# Patient Record
Sex: MALE | Race: WHITE | HISPANIC OR LATINO
[De-identification: names, ages, dates, MRNs, and addresses within clinical notes are randomized per-mention and may not be internally consistent; named-entity substitution may affect disease eponyms.]

---

## 2020-05-18 ENCOUNTER — APPOINTMENT (OUTPATIENT)
Dept: MRI IMAGING | Facility: CLINIC | Age: 45
End: 2020-05-18

## 2020-05-18 ENCOUNTER — APPOINTMENT (OUTPATIENT)
Dept: RADIOLOGY | Facility: CLINIC | Age: 45
End: 2020-05-18

## 2020-06-04 ENCOUNTER — APPOINTMENT (OUTPATIENT)
Dept: PULMONOLOGY | Facility: CLINIC | Age: 45
End: 2020-06-04
Payer: COMMERCIAL

## 2020-06-04 VITALS
SYSTOLIC BLOOD PRESSURE: 139 MMHG | HEIGHT: 66 IN | HEART RATE: 85 BPM | OXYGEN SATURATION: 98 % | WEIGHT: 165 LBS | BODY MASS INDEX: 26.52 KG/M2 | DIASTOLIC BLOOD PRESSURE: 81 MMHG | TEMPERATURE: 98.1 F

## 2020-06-04 VITALS — DIASTOLIC BLOOD PRESSURE: 90 MMHG | SYSTOLIC BLOOD PRESSURE: 135 MMHG

## 2020-06-04 DIAGNOSIS — K21.9 GASTRO-ESOPHAGEAL REFLUX DISEASE W/OUT ESOPHAGITIS: ICD-10-CM

## 2020-06-04 DIAGNOSIS — G47.33 OBSTRUCTIVE SLEEP APNEA (ADULT) (PEDIATRIC): ICD-10-CM

## 2020-06-04 PROCEDURE — 99203 OFFICE O/P NEW LOW 30 MIN: CPT

## 2020-06-05 PROBLEM — K21.9 CHRONIC GERD: Status: ACTIVE | Noted: 2020-06-05

## 2020-06-05 PROBLEM — G47.33 OSA (OBSTRUCTIVE SLEEP APNEA): Status: ACTIVE | Noted: 2020-06-05

## 2020-06-05 NOTE — ASSESSMENT
[FreeTextEntry1] : History of sleep apnea currently has CPAP machine s10 fixed pressure device\par Lower pressure to 4 to assess tolerance. If tolerated increase based on AHI.\par Need to review diagnostic study to determine if patient is candidate for oral appliance therapy

## 2020-06-05 NOTE — PHYSICAL EXAM

## 2020-06-05 NOTE — HISTORY OF PRESENT ILLNESS
[Obstructive Sleep Apnea] : obstructive sleep apnea [Awakes Unrefreshed] : awakes unrefreshed [Awakes with Headache] : awakes with headache [Awakes with Dry Mouth] : awakes with dry mouth [Fatigue] : fatigue [Tired while Driving] : tired while driving [Snoring] : snoring [Witnessed Apneas] : witnessed apneas [Never] : never [TextBox_4] : Here for sleep apnea evaluation\par Presented with typical sleep apnea symptoms snoring witnessed apneas nonrestorative sleep\par Underwent diagnostic sleep study not available.\par Referred for titration study titrated to effective pressure of 6 CM and placed on CPAP\par Has been unable to sleep with CPAP more than one or 2 hours\par Weight is up and is unable to keep it on\par He is highly motivated to succeed with treatment.\par

## 2020-07-24 ENCOUNTER — APPOINTMENT (OUTPATIENT)
Dept: PULMONOLOGY | Facility: CLINIC | Age: 45
End: 2020-07-24
Payer: COMMERCIAL

## 2020-07-24 VITALS
HEART RATE: 69 BPM | HEIGHT: 66 IN | BODY MASS INDEX: 26.52 KG/M2 | OXYGEN SATURATION: 96 % | DIASTOLIC BLOOD PRESSURE: 83 MMHG | SYSTOLIC BLOOD PRESSURE: 118 MMHG | WEIGHT: 165 LBS | TEMPERATURE: 97.9 F

## 2020-07-24 PROCEDURE — 99213 OFFICE O/P EST LOW 20 MIN: CPT

## 2020-07-24 NOTE — PHYSICAL EXAM
[No Acute Distress] : no acute distress [II] : Mallampati Class: II [Normal Oropharynx] : normal oropharynx [Normal Appearance] : normal appearance [Normal Rate/Rhythm] : normal rate/rhythm [No Neck Mass] : no neck mass [Normal S1, S2] : normal s1, s2 [No Murmurs] : no murmurs [Clear to Auscultation Bilaterally] : clear to auscultation bilaterally [No Abnormalities] : no abnormalities [No Resp Distress] : no resp distress [Benign] : benign [No Clubbing] : no clubbing [Normal Gait] : normal gait [No Edema] : no edema [No Cyanosis] : no cyanosis [Normal Color/ Pigmentation] : normal color/ pigmentation [FROM] : FROM [No Focal Deficits] : no focal deficits [Oriented x3] : oriented x3 [Normal Affect] : normal affect

## 2020-07-24 NOTE — HISTORY OF PRESENT ILLNESS
[Never] : never [Obstructive Sleep Apnea] : obstructive sleep apnea [TextBox_4] : PRIOR: Here for sleep apnea evaluation\par Presented with typical sleep apnea symptoms snoring witnessed apneas nonrestorative sleep\par Underwent diagnostic sleep study not available.\par Referred for titration study titrated to effective pressure of 6 CM and placed on CPAP\par Has been unable to sleep with CPAP more than one or 2 hours\par Weight is up and is unable to keep it on\par He is highly motivated to succeed with treatment.\par \par \par CURRENT: Was not able to tolerate CPAP 4\par Requesting retrial with different mask\par Unable to locate initial diagnostic study [Awakes with Headache] : awakes with headache [Awakes with Dry Mouth] : awakes with dry mouth [Awakes Unrefreshed] : awakes unrefreshed [Snoring] : snoring [Fatigue] : fatigue [Tired while Driving] : tired while driving [Witnessed Apneas] : witnessed apneas

## 2020-07-24 NOTE — ASSESSMENT
[FreeTextEntry1] : History of sleep apnea currently has CPAP machine s10 fixed pressure device\par Retry CPAP for with wisp nasal mask\par Obtain new diagnostic study to determine candidacy for oral appliance therapy

## 2020-10-28 ENCOUNTER — APPOINTMENT (OUTPATIENT)
Dept: GASTROENTEROLOGY | Facility: CLINIC | Age: 45
End: 2020-10-28
Payer: COMMERCIAL

## 2020-10-28 PROCEDURE — 99072 ADDL SUPL MATRL&STAF TM PHE: CPT

## 2020-10-28 PROCEDURE — 99244 OFF/OP CNSLTJ NEW/EST MOD 40: CPT | Mod: 25

## 2020-10-28 NOTE — PHYSICAL EXAM
[General Appearance - Alert] : alert [Sclera] : the sclera and conjunctiva were normal [Outer Ear] : the ears and nose were normal in appearance [Neck Appearance] : the appearance of the neck was normal [Abdomen Soft] : soft [Abdomen Tenderness] : non-tender [] : no rash [No Focal Deficits] : no focal deficits [Affect] : the affect was normal

## 2020-11-02 NOTE — ASSESSMENT
[FreeTextEntry1] : 46 yo M with UC referred by Dr. Majano for 2nd opinion re: medical mgmt\par \par UC: history of pancolitis in 2003, now with proctitis on recent cscope in 2020 showing moderate disease.\par Currently in a flare with complaints of bloody diarrhea, urgency\par Recommend initiation of biologic- entyvio. Risks/benefits discussed and patient is agreeable. Until medication is approved, he will continue with mesalamine and prednisone taper\par Recommend repeating colonoscopy 6 months after starting therapy to assess for mucosal healing\par Routine blood work, and Hep B, Quantiferon prior to initiation \par \par Sunita Bray MD\par GI Fellow

## 2020-11-02 NOTE — CONSULT LETTER
[Dear  ___] : Dear  [unfilled], [Consult Letter:] : I had the pleasure of evaluating your patient, [unfilled]. [Please see my note below.] : Please see my note below. [Consult Closing:] : Thank you very much for allowing me to participate in the care of this patient.  If you have any questions, please do not hesitate to contact me. [Sincerely,] : Sincerely, [FreeTextEntry3] : Eduardo Johnson MD\par Associate Professor of Medicine\par Director IBD Program\par [facility]\par

## 2020-11-02 NOTE — HISTORY OF PRESENT ILLNESS
[FreeTextEntry1] : 44 yo M with HTN, UC, RINA, GERD referred by Dr. Majano to establish care \par \par Symptoms of bloody diarrhea in 2003, diagnosed with UC pancolitis . Since diagnosis he has been on mesalamine on and off. He would take it for a few months and then stop because he felt better. \par He has also been on steroid courses over 10 times in the past 17 years. \par Currently, since Aug, he reports being in a flare. He reports blood in stool, with 3 to 5 BMs diarrhea a day. Also reports urgency- he is a  and often has to pull to the side of the road to have a BM\par He is currently on prednisone 40, which he has been instructed to taper by 5 every few days.\par \par Trying to loose weight- eating healthier.\par \par Last colonoscopy in 10/2020- inflammation in rectum and sigmoid \par \par EIM: no rashes, ulcers. Joint pain in hands, knees, writs, hips - does not tarck GI symptoms \par \par FH: MGF: UC, colon cancer\par Meds: mesalamine, amlodipine, ppi. Denies NSAIDs\par Shx: inguinal hernia repair, vasectomy

## 2020-11-23 ENCOUNTER — NON-APPOINTMENT (OUTPATIENT)
Age: 45
End: 2020-11-23

## 2020-11-24 LAB
ALBUMIN SERPL ELPH-MCNC: 4.8 G/DL
ALP BLD-CCNC: 82 U/L
ALT SERPL-CCNC: 22 U/L
ANION GAP SERPL CALC-SCNC: 12 MMOL/L
AST SERPL-CCNC: 14 U/L
BASOPHILS # BLD AUTO: 0.01 K/UL
BASOPHILS NFR BLD AUTO: 0.2 %
BILIRUB SERPL-MCNC: 0.8 MG/DL
BUN SERPL-MCNC: 12 MG/DL
CALCIUM SERPL-MCNC: 9.6 MG/DL
CHLORIDE SERPL-SCNC: 103 MMOL/L
CO2 SERPL-SCNC: 26 MMOL/L
CREAT SERPL-MCNC: 0.91 MG/DL
CRP SERPL-MCNC: 0.48 MG/DL
EOSINOPHIL # BLD AUTO: 0.06 K/UL
EOSINOPHIL NFR BLD AUTO: 0.9 %
GLUCOSE SERPL-MCNC: 104 MG/DL
HCT VFR BLD CALC: 47.3 %
HGB BLD-MCNC: 15.6 G/DL
IMM GRANULOCYTES NFR BLD AUTO: 0.2 %
LYMPHOCYTES # BLD AUTO: 1.22 K/UL
LYMPHOCYTES NFR BLD AUTO: 19 %
MAN DIFF?: NORMAL
MCHC RBC-ENTMCNC: 30.4 PG
MCHC RBC-ENTMCNC: 33 GM/DL
MCV RBC AUTO: 92.2 FL
MONOCYTES # BLD AUTO: 0.54 K/UL
MONOCYTES NFR BLD AUTO: 8.4 %
NEUTROPHILS # BLD AUTO: 4.59 K/UL
NEUTROPHILS NFR BLD AUTO: 71.3 %
PLATELET # BLD AUTO: 334 K/UL
POTASSIUM SERPL-SCNC: 4.8 MMOL/L
PROT SERPL-MCNC: 7.3 G/DL
RBC # BLD: 5.13 M/UL
RBC # FLD: 12.6 %
SODIUM SERPL-SCNC: 140 MMOL/L
WBC # FLD AUTO: 6.43 K/UL

## 2020-11-25 LAB
HBV CORE IGG+IGM SER QL: NONREACTIVE
HBV SURFACE AB SER QL: NONREACTIVE
HBV SURFACE AG SER QL: NONREACTIVE

## 2020-11-30 LAB
M TB IFN-G BLD-IMP: NEGATIVE
QUANTIFERON TB PLUS MITOGEN MINUS NIL: 5.83 IU/ML
QUANTIFERON TB PLUS NIL: 0.01 IU/ML
QUANTIFERON TB PLUS TB1 MINUS NIL: 0 IU/ML
QUANTIFERON TB PLUS TB2 MINUS NIL: 0 IU/ML

## 2020-12-15 DIAGNOSIS — Z00.00 ENCOUNTER FOR GENERAL ADULT MEDICAL EXAMINATION W/OUT ABNORMAL FINDINGS: ICD-10-CM

## 2020-12-28 ENCOUNTER — APPOINTMENT (OUTPATIENT)
Dept: RHEUMATOLOGY | Facility: CLINIC | Age: 45
End: 2020-12-28
Payer: COMMERCIAL

## 2020-12-28 VITALS
HEIGHT: 66 IN | OXYGEN SATURATION: 98 % | TEMPERATURE: 98 F | HEART RATE: 64 BPM | BODY MASS INDEX: 26.52 KG/M2 | SYSTOLIC BLOOD PRESSURE: 128 MMHG | WEIGHT: 165 LBS | RESPIRATION RATE: 15 BRPM | DIASTOLIC BLOOD PRESSURE: 80 MMHG

## 2020-12-28 VITALS
HEIGHT: 66 IN | HEART RATE: 68 BPM | OXYGEN SATURATION: 97 % | WEIGHT: 165 LBS | RESPIRATION RATE: 16 BRPM | DIASTOLIC BLOOD PRESSURE: 82 MMHG | BODY MASS INDEX: 26.52 KG/M2 | TEMPERATURE: 97.8 F | SYSTOLIC BLOOD PRESSURE: 122 MMHG

## 2020-12-28 LAB — SARS-COV-2 N GENE NPH QL NAA+PROBE: NOT DETECTED

## 2020-12-28 PROCEDURE — 96365 THER/PROPH/DIAG IV INF INIT: CPT

## 2020-12-28 PROCEDURE — 99072 ADDL SUPL MATRL&STAF TM PHE: CPT

## 2020-12-28 RX ORDER — VEDOLIZUMAB 300 MG/5ML
300 INJECTION, POWDER, LYOPHILIZED, FOR SOLUTION INTRAVENOUS
Qty: 0 | Refills: 0 | Status: COMPLETED | OUTPATIENT
Start: 2020-12-18

## 2021-01-11 ENCOUNTER — APPOINTMENT (OUTPATIENT)
Dept: RHEUMATOLOGY | Facility: CLINIC | Age: 46
End: 2021-01-11
Payer: COMMERCIAL

## 2021-01-11 VITALS
DIASTOLIC BLOOD PRESSURE: 83 MMHG | TEMPERATURE: 98.4 F | OXYGEN SATURATION: 98 % | RESPIRATION RATE: 14 BRPM | HEIGHT: 66 IN | SYSTOLIC BLOOD PRESSURE: 127 MMHG | WEIGHT: 165 LBS | HEART RATE: 63 BPM | BODY MASS INDEX: 26.52 KG/M2

## 2021-01-11 PROCEDURE — 96365 THER/PROPH/DIAG IV INF INIT: CPT

## 2021-01-11 PROCEDURE — 99072 ADDL SUPL MATRL&STAF TM PHE: CPT

## 2021-01-11 RX ORDER — VEDOLIZUMAB 300 MG/5ML
300 INJECTION, POWDER, LYOPHILIZED, FOR SOLUTION INTRAVENOUS
Qty: 0 | Refills: 0 | Status: COMPLETED | OUTPATIENT
Start: 2021-01-11 | End: 1900-01-01

## 2021-01-11 RX ORDER — VEDOLIZUMAB 300 MG/5ML
300 INJECTION, POWDER, LYOPHILIZED, FOR SOLUTION INTRAVENOUS
Qty: 0 | Refills: 0 | Status: COMPLETED | OUTPATIENT
Start: 2021-01-11

## 2021-02-08 ENCOUNTER — APPOINTMENT (OUTPATIENT)
Dept: RHEUMATOLOGY | Facility: CLINIC | Age: 46
End: 2021-02-08
Payer: COMMERCIAL

## 2021-02-08 VITALS
BODY MASS INDEX: 26.52 KG/M2 | TEMPERATURE: 98.5 F | HEART RATE: 72 BPM | DIASTOLIC BLOOD PRESSURE: 86 MMHG | OXYGEN SATURATION: 98 % | RESPIRATION RATE: 15 BRPM | HEIGHT: 66 IN | SYSTOLIC BLOOD PRESSURE: 128 MMHG | WEIGHT: 165 LBS

## 2021-02-08 VITALS
RESPIRATION RATE: 15 BRPM | TEMPERATURE: 98.4 F | OXYGEN SATURATION: 99 % | SYSTOLIC BLOOD PRESSURE: 126 MMHG | BODY MASS INDEX: 26.52 KG/M2 | DIASTOLIC BLOOD PRESSURE: 85 MMHG | HEART RATE: 68 BPM | HEIGHT: 66 IN | WEIGHT: 165 LBS

## 2021-02-08 PROCEDURE — 99072 ADDL SUPL MATRL&STAF TM PHE: CPT

## 2021-02-08 PROCEDURE — 96365 THER/PROPH/DIAG IV INF INIT: CPT

## 2021-02-08 RX ORDER — VEDOLIZUMAB 300 MG/5ML
300 INJECTION, POWDER, LYOPHILIZED, FOR SOLUTION INTRAVENOUS
Qty: 0 | Refills: 0 | Status: COMPLETED | OUTPATIENT
Start: 2021-02-02

## 2021-02-09 LAB
ALBUMIN SERPL ELPH-MCNC: 4.6 G/DL
ALP BLD-CCNC: 85 U/L
ALT SERPL-CCNC: 28 U/L
ANION GAP SERPL CALC-SCNC: 13 MMOL/L
AST SERPL-CCNC: 19 U/L
BASOPHILS # BLD AUTO: 0.01 K/UL
BASOPHILS NFR BLD AUTO: 0.2 %
BILIRUB SERPL-MCNC: 0.8 MG/DL
BUN SERPL-MCNC: 12 MG/DL
CALCIUM SERPL-MCNC: 10.1 MG/DL
CHLORIDE SERPL-SCNC: 101 MMOL/L
CO2 SERPL-SCNC: 25 MMOL/L
CREAT SERPL-MCNC: 1.07 MG/DL
CRP SERPL-MCNC: 0.29 MG/DL
EOSINOPHIL # BLD AUTO: 0.15 K/UL
EOSINOPHIL NFR BLD AUTO: 3.2 %
GLUCOSE SERPL-MCNC: 102 MG/DL
HCT VFR BLD CALC: 48.3 %
HGB BLD-MCNC: 15.6 G/DL
IMM GRANULOCYTES NFR BLD AUTO: 0.2 %
LYMPHOCYTES # BLD AUTO: 1.35 K/UL
LYMPHOCYTES NFR BLD AUTO: 28.5 %
MAN DIFF?: NORMAL
MCHC RBC-ENTMCNC: 29.7 PG
MCHC RBC-ENTMCNC: 32.3 GM/DL
MCV RBC AUTO: 91.8 FL
MONOCYTES # BLD AUTO: 0.38 K/UL
MONOCYTES NFR BLD AUTO: 8 %
NEUTROPHILS # BLD AUTO: 2.83 K/UL
NEUTROPHILS NFR BLD AUTO: 59.9 %
PLATELET # BLD AUTO: 304 K/UL
POTASSIUM SERPL-SCNC: 4.3 MMOL/L
PROT SERPL-MCNC: 7.1 G/DL
RBC # BLD: 5.26 M/UL
RBC # FLD: 13 %
SODIUM SERPL-SCNC: 138 MMOL/L
WBC # FLD AUTO: 4.73 K/UL

## 2021-02-11 ENCOUNTER — APPOINTMENT (OUTPATIENT)
Dept: GASTROENTEROLOGY | Facility: CLINIC | Age: 46
End: 2021-02-11
Payer: COMMERCIAL

## 2021-02-11 PROCEDURE — 99214 OFFICE O/P EST MOD 30 MIN: CPT | Mod: GC,95

## 2021-02-11 RX ORDER — MESALAMINE 1.2 G/1
1.2 TABLET, DELAYED RELEASE ORAL DAILY
Qty: 60 | Refills: 2 | Status: DISCONTINUED | COMMUNITY
Start: 2020-10-28 | End: 2021-02-11

## 2021-02-11 NOTE — REASON FOR VISIT
[Home] : at home, [unfilled] , at the time of the visit. [Medical Office: ([facility])___] : at the medical office located in  [Verbal consent obtained from patient] : the patient, [unfilled] [Follow-Up: _____] : a [unfilled] follow-up visit

## 2021-02-13 NOTE — ASSESSMENT
[FreeTextEntry1] : 45 yo M with UC on Entyvio q8w, HTN, RINA, GERD here for followup. 3rd loading dose of Entyvio on 2/8/21\par \par Ulcerative Pancolitis dx in 2003 on Entyvio q8w \par Reports improvement in symptoms with mild disease.  Denies abd cramping, urgency or bloody BM. Completed 3rd loading dose on 2/8/21. \par -Continue Entyvio\par -Schedule colonoscopy in 2 months to assess mucosal healing\par \par GERD\par No change in symptoms, reports continue intermittent dysphagia since last endoscopy.  Last EGD in 2020 notable for hiatal hernia and gastritis.  Denies odynophagia, change in voice or unexplained wt loss.\par -On PPI intermittently, will trial 6 weeks of medication and reassess symptoms; consider esophagram at f/u\par -Lifestyle modification discussed including avoidance of food triggers\par \par HCM\par -Declined influenza vaccine \par -Hep B sAb nonreactive, discussed following PMD\par -COVID19 vaccine second dose on 2/9\par -Exposure to prednisone in the past, would obtain DEXA\par -Encourage annual skin examination with dermatology\par \par RTC 4w after next infusion of Entyvio\par \par Mukund Massey MD\par Gastroenterology Fellow

## 2021-02-13 NOTE — CONSULT LETTER
[Dear  ___] : Dear  [unfilled], [Courtesy Letter:] : I had the pleasure of seeing your patient, [unfilled], in my office today. [Please see my note below.] : Please see my note below. [Sincerely,] : Sincerely, [FreeTextEntry3] : Edaurdo Johnson MD\par Associate Professor of Medicine\par Director IBD Program\par [facility]\par

## 2021-02-13 NOTE — HISTORY OF PRESENT ILLNESS
[de-identified] : Requested interview via telephone\par 45 yo M with UC on Entyvio q8w, HTN, RINA, GERD here for followup.  Last infusion of Entyvio on 2/8/21\par Follows with Dr. Majano\par \par Abdominal pain improved without bloating/gassiness.  Belching improved as well.  Occasional PPI.  Intermittent dysphagia unchanged since last endoscopy.  \par Having 1-2 formed stool, denies blood or urgency\par Denies fever, chill, cp, sob, abd pain, nausea, vomiting, constipation, diarrhea, odynophagia\par \par Lab 2/5/21 \par WBC 4.7, Hgb 15.6, Plt 304\par Na 138, K 4.3, Cl 101, BUN 12, Cr 1.07, Bili 0.8, 85, AST 19, ALT 28, CRP 0.29 <- 0.48 (11/24/20)\par Hep B surf antibody nonreactive\par \par EIM: denies\par \par Initial Visit\par Symptoms of bloody diarrhea in 2003, diagnosed with UC pancolitis . Since diagnosis he has been on mesalamine on and off. He would take it for a few months and then stop because he felt better. \par He has also been on steroid courses over 10 times in the past 17 years. \par Currently, since Aug, he reports being in a flare. He reports blood in stool, with 3 to 5 BMs diarrhea a day. Also reports urgency- he is a  and often has to pull to the side of the road to have a BM\par He is currently on prednisone 40, which he has been instructed to taper by 5 every few days.\par \par Trying to loose weight- eating healthier.\par \par ENDOSCOPY\par Endoscopy 10/2020: Small hiatal hernia and gastritis\par Colonoscopy in 10/2020- inflammation in rectum and sigmoid \par \par FH: MGF: UC, colon cancer\par Meds: Entyvio, amlodipine, pantoprazole prn. Denies NSAIDs\par Shx: inguinal hernia repair, vasectomy

## 2021-04-05 ENCOUNTER — APPOINTMENT (OUTPATIENT)
Dept: RHEUMATOLOGY | Facility: CLINIC | Age: 46
End: 2021-04-05
Payer: COMMERCIAL

## 2021-04-05 ENCOUNTER — LABORATORY RESULT (OUTPATIENT)
Age: 46
End: 2021-04-05

## 2021-04-05 VITALS
DIASTOLIC BLOOD PRESSURE: 82 MMHG | RESPIRATION RATE: 15 BRPM | TEMPERATURE: 98 F | BODY MASS INDEX: 26.52 KG/M2 | OXYGEN SATURATION: 96 % | SYSTOLIC BLOOD PRESSURE: 119 MMHG | WEIGHT: 165 LBS | HEART RATE: 65 BPM | HEIGHT: 66 IN

## 2021-04-05 VITALS
SYSTOLIC BLOOD PRESSURE: 128 MMHG | HEART RATE: 70 BPM | RESPIRATION RATE: 16 BRPM | OXYGEN SATURATION: 96 % | BODY MASS INDEX: 26.52 KG/M2 | DIASTOLIC BLOOD PRESSURE: 85 MMHG | WEIGHT: 165 LBS | TEMPERATURE: 97.7 F | HEIGHT: 66 IN

## 2021-04-05 PROCEDURE — 99072 ADDL SUPL MATRL&STAF TM PHE: CPT

## 2021-04-05 PROCEDURE — 96365 THER/PROPH/DIAG IV INF INIT: CPT

## 2021-04-05 RX ORDER — VEDOLIZUMAB 300 MG/5ML
300 INJECTION, POWDER, LYOPHILIZED, FOR SOLUTION INTRAVENOUS
Qty: 0 | Refills: 0 | Status: COMPLETED | OUTPATIENT
Start: 2021-04-05 | End: 1900-01-01

## 2021-04-05 RX ORDER — VEDOLIZUMAB 300 MG/5ML
300 INJECTION, POWDER, LYOPHILIZED, FOR SOLUTION INTRAVENOUS
Qty: 0 | Refills: 0 | Status: COMPLETED | OUTPATIENT
Start: 2021-04-05

## 2021-04-12 LAB
ANTIBODIES TO VEDOLIZUMAB (ATV) CONCENTRATION: < 1.6 U/ML
PROMETHEUS ANSER VDZ: NORMAL
PROMETHEUS LABORATORY FOOTER: NORMAL
SERUM VEDOLIZUMAB (VDZ) CONCENTRATION: 31.4 UG/ML

## 2021-06-02 ENCOUNTER — APPOINTMENT (OUTPATIENT)
Dept: RHEUMATOLOGY | Facility: CLINIC | Age: 46
End: 2021-06-02
Payer: COMMERCIAL

## 2021-06-02 VITALS
TEMPERATURE: 98 F | OXYGEN SATURATION: 99 % | DIASTOLIC BLOOD PRESSURE: 82 MMHG | HEART RATE: 63 BPM | SYSTOLIC BLOOD PRESSURE: 127 MMHG | RESPIRATION RATE: 20 BRPM

## 2021-06-02 VITALS
HEART RATE: 70 BPM | SYSTOLIC BLOOD PRESSURE: 130 MMHG | RESPIRATION RATE: 16 BRPM | TEMPERATURE: 97.9 F | DIASTOLIC BLOOD PRESSURE: 83 MMHG | BODY MASS INDEX: 27.64 KG/M2 | HEIGHT: 66 IN | OXYGEN SATURATION: 97 % | WEIGHT: 172 LBS

## 2021-06-02 PROCEDURE — 96365 THER/PROPH/DIAG IV INF INIT: CPT

## 2021-06-02 RX ORDER — VEDOLIZUMAB 300 MG/5ML
300 INJECTION, POWDER, LYOPHILIZED, FOR SOLUTION INTRAVENOUS
Qty: 0 | Refills: 0 | Status: COMPLETED | OUTPATIENT
Start: 2021-05-25

## 2021-06-03 LAB
ALBUMIN SERPL ELPH-MCNC: 4.9 G/DL
ALP BLD-CCNC: 81 U/L
ALT SERPL-CCNC: 33 U/L
ANION GAP SERPL CALC-SCNC: 13 MMOL/L
AST SERPL-CCNC: 24 U/L
BASOPHILS # BLD AUTO: 0.02 K/UL
BASOPHILS NFR BLD AUTO: 0.4 %
BILIRUB SERPL-MCNC: 0.7 MG/DL
BUN SERPL-MCNC: 9 MG/DL
CALCIUM SERPL-MCNC: 9.8 MG/DL
CHLORIDE SERPL-SCNC: 101 MMOL/L
CO2 SERPL-SCNC: 26 MMOL/L
CREAT SERPL-MCNC: 0.97 MG/DL
CRP SERPL-MCNC: 3 MG/L
EOSINOPHIL # BLD AUTO: 0.19 K/UL
EOSINOPHIL NFR BLD AUTO: 3.9 %
GLUCOSE SERPL-MCNC: 99 MG/DL
HCT VFR BLD CALC: 46.9 %
HGB BLD-MCNC: 15.4 G/DL
IMM GRANULOCYTES NFR BLD AUTO: 0.2 %
LYMPHOCYTES # BLD AUTO: 1.25 K/UL
LYMPHOCYTES NFR BLD AUTO: 25.9 %
MAN DIFF?: NORMAL
MCHC RBC-ENTMCNC: 29.7 PG
MCHC RBC-ENTMCNC: 32.8 GM/DL
MCV RBC AUTO: 90.4 FL
MONOCYTES # BLD AUTO: 0.42 K/UL
MONOCYTES NFR BLD AUTO: 8.7 %
NEUTROPHILS # BLD AUTO: 2.94 K/UL
NEUTROPHILS NFR BLD AUTO: 60.9 %
PLATELET # BLD AUTO: 363 K/UL
POTASSIUM SERPL-SCNC: 4.7 MMOL/L
PROT SERPL-MCNC: 7.4 G/DL
RBC # BLD: 5.19 M/UL
RBC # FLD: 13 %
SODIUM SERPL-SCNC: 140 MMOL/L
WBC # FLD AUTO: 4.83 K/UL

## 2021-06-23 ENCOUNTER — APPOINTMENT (OUTPATIENT)
Dept: GASTROENTEROLOGY | Facility: CLINIC | Age: 46
End: 2021-06-23
Payer: COMMERCIAL

## 2021-06-23 VITALS
HEIGHT: 66 IN | SYSTOLIC BLOOD PRESSURE: 109 MMHG | TEMPERATURE: 97.8 F | WEIGHT: 165 LBS | BODY MASS INDEX: 26.52 KG/M2 | HEART RATE: 80 BPM | OXYGEN SATURATION: 97 % | RESPIRATION RATE: 16 BRPM | DIASTOLIC BLOOD PRESSURE: 60 MMHG

## 2021-06-23 DIAGNOSIS — R13.10 DYSPHAGIA, UNSPECIFIED: ICD-10-CM

## 2021-06-23 PROCEDURE — 99213 OFFICE O/P EST LOW 20 MIN: CPT

## 2021-06-24 NOTE — ASSESSMENT
[FreeTextEntry1] : 45 yo M with UC on Entyvio q8w, HTN, RINA, GERD here for followup, reports being 80% improved on Vedolizumab. Still having some unpredicatbility with looser stools after certain meals 1-2x/month. \par \par Ulcerative Pancolitis dx in 2003 on Entyvio q8w since Dec 2020 \par Reports improvement in symptoms by 80%.  Denies abd cramping, urgency or bloody BM.\par -Continue Entyvio, level 31.4 in April with no antibodies\par -Schedule colonoscopy with Dr. Khan to confirm mucosal healing and surveillance due Oct 2021\par \par GERD\par No change in symptoms, reports continue intermittent dysphagia since last endoscopy.  Last EGD in 2020 notable for hiatal hernia and gastritis.  Denies odynophagia, change in voice or unexplained wt loss.\par -On PPI with relief of burning but not dysphagia\par - esophagram emphasized to rule out dysmotility\par - if normal consider referral to discuss hiatal hernia surgery options \par \par HCM\par -Declined influenza vaccine \par -Hep B sAb nonreactive, discussed following PMD\par -COVID19 vaccine UTD\par -Exposure to prednisone in the past, would obtain DEXA; re-emphasized\par -Encourage annual skin examination with dermatology\par \par RTC post-procedure with Dr. Emerson

## 2021-06-24 NOTE — CONSULT LETTER
[Dear  ___] : Dear  [unfilled], [Courtesy Letter:] : I had the pleasure of seeing your patient, [unfilled], in my office today. [Please see my note below.] : Please see my note below. [Sincerely,] : Sincerely, [FreeTextEntry3] : Eduardo Johnson MD\par Associate Professor of Medicine\par Director IBD Program\par [facility]\par

## 2021-06-24 NOTE — HISTORY OF PRESENT ILLNESS
[de-identified] : \par 45 yo M with UC on Entyvio q8w since Dec 2020 with 80% improvement in symptoms; HTN, RINA, GERD here for followup. \par Referred by Dr. Majano\par \par Intermittent dysphagia unchanged since last endoscopy showed hiatal hernia despite PPI, heartburn has resolved.\par Having 1-2 formed stool, denies blood or urgency\par Denies fever, chill, cp, sob, abd pain, nausea, vomiting, constipation, diarrhea, odynophagia\par Does report looser stools 1-2x/month after certain meals, cannot predict or identify which meals. + flatulence on occasion from certain foods. Avoids dairy and caffeine. \par CRP normalized.\par \par EIM: denies\par \par Initial Visit\par Symptoms of bloody diarrhea in 2003, diagnosed with UC pancolitis . Since diagnosis he has been on mesalamine on and off. He would take it for a few months and then stop because he felt better. \par \par \par ENDOSCOPY\par Endoscopy 10/2020: Small hiatal hernia and gastritis\par Colonoscopy in 10/2020- inflammation in rectum and sigmoid \par \par FH: MGF: UC, colon cancer\par Meds: Entyvio, amlodipine, pantoprazole prn. Denies NSAIDs\par Shx: inguinal hernia repair, vasectomy \par Works as  in [place].

## 2021-06-24 NOTE — PHYSICAL EXAM
[General Appearance - Alert] : alert [General Appearance - In No Acute Distress] : in no acute distress [General Appearance - Well Nourished] : well nourished [Neck Appearance] : the appearance of the neck was normal [Neck Cervical Mass (___cm)] : no neck mass was observed [Respiration, Rhythm And Depth] : normal respiratory rhythm and effort [Exaggerated Use Of Accessory Muscles For Inspiration] : no accessory muscle use [Bowel Sounds] : normal bowel sounds [Abdomen Soft] : soft [Abdomen Tenderness] : non-tender [Abnormal Walk] : normal gait [Nail Clubbing] : no clubbing  or cyanosis of the fingernails [Musculoskeletal - Swelling] : no joint swelling seen [Skin Color & Pigmentation] : normal skin color and pigmentation [Skin Turgor] : normal skin turgor [Oriented To Time, Place, And Person] : oriented to person, place, and time [] : no rash [Impaired Insight] : insight and judgment were intact [Affect] : the affect was normal

## 2021-07-27 ENCOUNTER — APPOINTMENT (OUTPATIENT)
Dept: RHEUMATOLOGY | Facility: CLINIC | Age: 46
End: 2021-07-27
Payer: COMMERCIAL

## 2021-07-27 ENCOUNTER — NON-APPOINTMENT (OUTPATIENT)
Age: 46
End: 2021-07-27

## 2021-07-27 VITALS
SYSTOLIC BLOOD PRESSURE: 127 MMHG | DIASTOLIC BLOOD PRESSURE: 84 MMHG | RESPIRATION RATE: 16 BRPM | HEART RATE: 64 BPM | TEMPERATURE: 97.9 F | OXYGEN SATURATION: 98 %

## 2021-07-27 VITALS
TEMPERATURE: 98 F | BODY MASS INDEX: 26.52 KG/M2 | OXYGEN SATURATION: 98 % | DIASTOLIC BLOOD PRESSURE: 82 MMHG | RESPIRATION RATE: 16 BRPM | HEART RATE: 68 BPM | SYSTOLIC BLOOD PRESSURE: 121 MMHG | WEIGHT: 165 LBS | HEIGHT: 66 IN

## 2021-07-27 PROCEDURE — 96365 THER/PROPH/DIAG IV INF INIT: CPT

## 2021-07-27 RX ORDER — VEDOLIZUMAB 300 MG/5ML
300 INJECTION, POWDER, LYOPHILIZED, FOR SOLUTION INTRAVENOUS
Qty: 0 | Refills: 0 | Status: COMPLETED | OUTPATIENT
Start: 2021-07-20

## 2021-07-28 LAB
ALBUMIN SERPL ELPH-MCNC: 4.8 G/DL
ALP BLD-CCNC: 86 U/L
ALT SERPL-CCNC: 26 U/L
ANION GAP SERPL CALC-SCNC: 12 MMOL/L
AST SERPL-CCNC: 17 U/L
BASOPHILS # BLD AUTO: 0.01 K/UL
BASOPHILS NFR BLD AUTO: 0.2 %
BILIRUB SERPL-MCNC: 1.1 MG/DL
BUN SERPL-MCNC: 11 MG/DL
CALCIUM SERPL-MCNC: 9.6 MG/DL
CHLORIDE SERPL-SCNC: 101 MMOL/L
CO2 SERPL-SCNC: 27 MMOL/L
CREAT SERPL-MCNC: 0.95 MG/DL
CRP SERPL-MCNC: 7 MG/L
EOSINOPHIL # BLD AUTO: 0.12 K/UL
EOSINOPHIL NFR BLD AUTO: 2.4 %
GLUCOSE SERPL-MCNC: 100 MG/DL
HCT VFR BLD CALC: 47.2 %
HGB BLD-MCNC: 15.5 G/DL
IMM GRANULOCYTES NFR BLD AUTO: 0.2 %
LYMPHOCYTES # BLD AUTO: 1.1 K/UL
LYMPHOCYTES NFR BLD AUTO: 22.2 %
MAN DIFF?: NORMAL
MCHC RBC-ENTMCNC: 30.3 PG
MCHC RBC-ENTMCNC: 32.8 GM/DL
MCV RBC AUTO: 92.4 FL
MONOCYTES # BLD AUTO: 0.54 K/UL
MONOCYTES NFR BLD AUTO: 10.9 %
NEUTROPHILS # BLD AUTO: 3.17 K/UL
NEUTROPHILS NFR BLD AUTO: 64.1 %
PLATELET # BLD AUTO: 328 K/UL
POTASSIUM SERPL-SCNC: 4.4 MMOL/L
PROT SERPL-MCNC: 7.5 G/DL
RBC # BLD: 5.11 M/UL
RBC # FLD: 13.1 %
SODIUM SERPL-SCNC: 140 MMOL/L
WBC # FLD AUTO: 4.95 K/UL

## 2021-09-21 ENCOUNTER — APPOINTMENT (OUTPATIENT)
Dept: RHEUMATOLOGY | Facility: CLINIC | Age: 46
End: 2021-09-21
Payer: COMMERCIAL

## 2021-09-21 VITALS
DIASTOLIC BLOOD PRESSURE: 86 MMHG | TEMPERATURE: 98.1 F | HEART RATE: 66 BPM | OXYGEN SATURATION: 98 % | SYSTOLIC BLOOD PRESSURE: 126 MMHG | RESPIRATION RATE: 14 BRPM

## 2021-09-21 VITALS
RESPIRATION RATE: 14 BRPM | SYSTOLIC BLOOD PRESSURE: 118 MMHG | OXYGEN SATURATION: 98 % | TEMPERATURE: 98.1 F | DIASTOLIC BLOOD PRESSURE: 79 MMHG | HEART RATE: 67 BPM

## 2021-09-21 PROCEDURE — 96365 THER/PROPH/DIAG IV INF INIT: CPT

## 2021-09-21 RX ORDER — VEDOLIZUMAB 300 MG/5ML
300 INJECTION, POWDER, LYOPHILIZED, FOR SOLUTION INTRAVENOUS
Qty: 0 | Refills: 0 | Status: COMPLETED | OUTPATIENT
Start: 2021-09-14

## 2021-11-23 ENCOUNTER — APPOINTMENT (OUTPATIENT)
Dept: RHEUMATOLOGY | Facility: CLINIC | Age: 46
End: 2021-11-23
Payer: COMMERCIAL

## 2021-11-23 VITALS
RESPIRATION RATE: 14 BRPM | HEART RATE: 71 BPM | OXYGEN SATURATION: 96 % | DIASTOLIC BLOOD PRESSURE: 81 MMHG | SYSTOLIC BLOOD PRESSURE: 117 MMHG | TEMPERATURE: 97.3 F

## 2021-11-23 VITALS
TEMPERATURE: 97.3 F | RESPIRATION RATE: 14 BRPM | HEART RATE: 65 BPM | SYSTOLIC BLOOD PRESSURE: 133 MMHG | OXYGEN SATURATION: 100 % | DIASTOLIC BLOOD PRESSURE: 83 MMHG

## 2021-11-23 PROCEDURE — 96365 THER/PROPH/DIAG IV INF INIT: CPT

## 2021-11-23 RX ORDER — VEDOLIZUMAB 300 MG/5ML
300 INJECTION, POWDER, LYOPHILIZED, FOR SOLUTION INTRAVENOUS
Qty: 0 | Refills: 0 | Status: COMPLETED | OUTPATIENT
Start: 2021-11-09

## 2021-11-24 LAB
BASOPHILS # BLD AUTO: 0.01 K/UL
BASOPHILS NFR BLD AUTO: 0.2 %
EOSINOPHIL # BLD AUTO: 0.14 K/UL
EOSINOPHIL NFR BLD AUTO: 2.3 %
HCT VFR BLD CALC: 48.3 %
HGB BLD-MCNC: 15.5 G/DL
IMM GRANULOCYTES NFR BLD AUTO: 0 %
LYMPHOCYTES # BLD AUTO: 1.23 K/UL
LYMPHOCYTES NFR BLD AUTO: 20.6 %
MAN DIFF?: NORMAL
MCHC RBC-ENTMCNC: 29.8 PG
MCHC RBC-ENTMCNC: 32.1 GM/DL
MCV RBC AUTO: 92.9 FL
MONOCYTES # BLD AUTO: 0.58 K/UL
MONOCYTES NFR BLD AUTO: 9.7 %
NEUTROPHILS # BLD AUTO: 4 K/UL
NEUTROPHILS NFR BLD AUTO: 67.2 %
PLATELET # BLD AUTO: 340 K/UL
RBC # BLD: 5.2 M/UL
RBC # FLD: 13 %
WBC # FLD AUTO: 5.96 K/UL

## 2021-11-25 LAB
ALBUMIN SERPL ELPH-MCNC: 4.6 G/DL
ALP BLD-CCNC: 87 U/L
ALT SERPL-CCNC: 31 U/L
ANION GAP SERPL CALC-SCNC: 12 MMOL/L
AST SERPL-CCNC: 21 U/L
BILIRUB SERPL-MCNC: 0.9 MG/DL
BUN SERPL-MCNC: 12 MG/DL
CALCIUM SERPL-MCNC: 9.5 MG/DL
CHLORIDE SERPL-SCNC: 103 MMOL/L
CO2 SERPL-SCNC: 25 MMOL/L
CREAT SERPL-MCNC: 0.97 MG/DL
CRP SERPL-MCNC: 5 MG/L
GLUCOSE SERPL-MCNC: 102 MG/DL
POTASSIUM SERPL-SCNC: 4.6 MMOL/L
PROT SERPL-MCNC: 7.4 G/DL
SODIUM SERPL-SCNC: 141 MMOL/L

## 2022-01-18 ENCOUNTER — APPOINTMENT (OUTPATIENT)
Dept: RHEUMATOLOGY | Facility: CLINIC | Age: 47
End: 2022-01-18
Payer: COMMERCIAL

## 2022-01-18 VITALS
HEART RATE: 74 BPM | DIASTOLIC BLOOD PRESSURE: 85 MMHG | TEMPERATURE: 98 F | OXYGEN SATURATION: 97 % | RESPIRATION RATE: 14 BRPM | SYSTOLIC BLOOD PRESSURE: 128 MMHG

## 2022-01-18 VITALS
SYSTOLIC BLOOD PRESSURE: 118 MMHG | DIASTOLIC BLOOD PRESSURE: 79 MMHG | HEART RATE: 65 BPM | RESPIRATION RATE: 14 BRPM | OXYGEN SATURATION: 97 % | TEMPERATURE: 98 F

## 2022-01-18 PROCEDURE — 96365 THER/PROPH/DIAG IV INF INIT: CPT

## 2022-01-18 RX ORDER — VEDOLIZUMAB 300 MG/5ML
300 INJECTION, POWDER, LYOPHILIZED, FOR SOLUTION INTRAVENOUS
Qty: 0 | Refills: 0 | Status: COMPLETED | OUTPATIENT
Start: 2022-01-04

## 2022-01-19 ENCOUNTER — NON-APPOINTMENT (OUTPATIENT)
Age: 47
End: 2022-01-19

## 2022-01-19 LAB
ALBUMIN SERPL ELPH-MCNC: 5 G/DL
ALP BLD-CCNC: 82 U/L
ALT SERPL-CCNC: 37 U/L
ANION GAP SERPL CALC-SCNC: 13 MMOL/L
AST SERPL-CCNC: 25 U/L
BASOPHILS # BLD AUTO: 0.01 K/UL
BASOPHILS NFR BLD AUTO: 0.2 %
BILIRUB SERPL-MCNC: 0.7 MG/DL
BUN SERPL-MCNC: 11 MG/DL
CALCIUM SERPL-MCNC: 9.8 MG/DL
CHLORIDE SERPL-SCNC: 102 MMOL/L
CO2 SERPL-SCNC: 27 MMOL/L
CREAT SERPL-MCNC: 1.01 MG/DL
CRP SERPL-MCNC: 3 MG/L
EOSINOPHIL # BLD AUTO: 0.1 K/UL
EOSINOPHIL NFR BLD AUTO: 2 %
GLUCOSE SERPL-MCNC: 109 MG/DL
HCT VFR BLD CALC: 50.5 %
HGB BLD-MCNC: 16 G/DL
IMM GRANULOCYTES NFR BLD AUTO: 0.2 %
LYMPHOCYTES # BLD AUTO: 1.4 K/UL
LYMPHOCYTES NFR BLD AUTO: 28.6 %
MAN DIFF?: NORMAL
MCHC RBC-ENTMCNC: 30.1 PG
MCHC RBC-ENTMCNC: 31.7 GM/DL
MCV RBC AUTO: 94.9 FL
MONOCYTES # BLD AUTO: 0.43 K/UL
MONOCYTES NFR BLD AUTO: 8.8 %
NEUTROPHILS # BLD AUTO: 2.94 K/UL
NEUTROPHILS NFR BLD AUTO: 60.2 %
PLATELET # BLD AUTO: 330 K/UL
POTASSIUM SERPL-SCNC: 4.8 MMOL/L
PROT SERPL-MCNC: 7.7 G/DL
RBC # BLD: 5.32 M/UL
RBC # FLD: 13.2 %
SODIUM SERPL-SCNC: 141 MMOL/L
WBC # FLD AUTO: 4.89 K/UL

## 2022-03-15 ENCOUNTER — APPOINTMENT (OUTPATIENT)
Dept: RHEUMATOLOGY | Facility: CLINIC | Age: 47
End: 2022-03-15
Payer: COMMERCIAL

## 2022-03-15 VITALS
HEART RATE: 65 BPM | OXYGEN SATURATION: 97 % | DIASTOLIC BLOOD PRESSURE: 85 MMHG | TEMPERATURE: 98.7 F | SYSTOLIC BLOOD PRESSURE: 126 MMHG | RESPIRATION RATE: 14 BRPM

## 2022-03-15 VITALS
OXYGEN SATURATION: 97 % | DIASTOLIC BLOOD PRESSURE: 84 MMHG | RESPIRATION RATE: 14 BRPM | HEART RATE: 66 BPM | TEMPERATURE: 98.7 F | SYSTOLIC BLOOD PRESSURE: 125 MMHG

## 2022-03-15 PROCEDURE — 96365 THER/PROPH/DIAG IV INF INIT: CPT

## 2022-03-15 RX ORDER — VEDOLIZUMAB 300 MG/5ML
300 INJECTION, POWDER, LYOPHILIZED, FOR SOLUTION INTRAVENOUS
Qty: 0 | Refills: 0 | Status: COMPLETED | OUTPATIENT
Start: 2022-03-15

## 2022-03-16 ENCOUNTER — LABORATORY RESULT (OUTPATIENT)
Age: 47
End: 2022-03-16

## 2022-03-16 ENCOUNTER — NON-APPOINTMENT (OUTPATIENT)
Age: 47
End: 2022-03-16

## 2022-03-16 ENCOUNTER — APPOINTMENT (OUTPATIENT)
Dept: CARDIOLOGY | Facility: CLINIC | Age: 47
End: 2022-03-16
Payer: COMMERCIAL

## 2022-03-16 VITALS
OXYGEN SATURATION: 98 % | BODY MASS INDEX: 26.52 KG/M2 | RESPIRATION RATE: 15 BRPM | HEART RATE: 65 BPM | TEMPERATURE: 98 F | WEIGHT: 165 LBS | HEIGHT: 66 IN

## 2022-03-16 DIAGNOSIS — Z82.49 FAMILY HISTORY OF ISCHEMIC HEART DISEASE AND OTHER DISEASES OF THE CIRCULATORY SYSTEM: ICD-10-CM

## 2022-03-16 PROCEDURE — 93000 ELECTROCARDIOGRAM COMPLETE: CPT

## 2022-03-16 PROCEDURE — 99203 OFFICE O/P NEW LOW 30 MIN: CPT

## 2022-03-16 NOTE — DISCUSSION/SUMMARY
[FreeTextEntry1] : This is a 47-year-old male with past medical history significant of hypertension, ulcerative colitis, dyspepsia, who comes in for cardiac consultation.  The patient is complaining of left-sided chest wall pain in the mid axillary area not associate with any particular activity.  He also had an episode of lightheadedness.  He denies chest pressure, shortness of breath, palpitations or syncope.\par He has no history of rheumatic fever.  He does not drink excessive caffeine or alcohol.\par Cardiac risk factors include hypertension, inflammatory bowel disease (is a risk enhancing feature), and positive family history of heart disease (his grandfather had a significant myocardial infarction age 45.\par The patient is on infusion therapy for his ulcerative colitis.\par Electrocardiogram done March 16, 2022 demonstrated normal sinus rhythm rate 65 bpm is otherwise unremarkable.\par Blood work done September 29, 2020 demonstrated cholesterol 135, HDL 38, triglycerides 190, LDL cholesterol 71 mg/dL.\par The patient reports that he had a normal stress echocardiogram the week of March 7, 2022.\par He will get me copies of this examination.\par His blood pressure is elevated today but he reports that his blood pressure is always within normal limits during his infusions, and other doctor visits.  He is currently taking amlodipine 5 mg daily, and I would recommend switching him to Micardis 80 mg daily if his blood pressure is elevated at the time of next visit.\par I feel that his chest pain is musculoskeletal in nature.  He was doing heavy work, cleaning his office and surrounding area.\par He will have new blood work done today for lipid panel SMA-20.\par He will follow up with me in 4 to 6 weeks to recheck his blood pressure.  He understands he must limit his salt intake.\par The patient understands that aerobic exercises must be increased to 40 minutes 4 times per week. A detailed discussion of lifestyle modification was done today. The patient has a good understanding of the diagnosis, and treatment plan. Lifestyle modification was also outlined.

## 2022-03-16 NOTE — PHYSICAL EXAM
[Well Developed] : well developed [Well Nourished] : well nourished [No Acute Distress] : no acute distress [Normal Conjunctiva] : normal conjunctiva [Normal Venous Pressure] : normal venous pressure [No Carotid Bruit] : no carotid bruit [Normal S1, S2] : normal S1, S2 [No Rub] : no rub [5th Left ICS - MCL] : palpated at the 5th LICS in the midclavicular line [Normal] : normal [No Precordial Heave] : no precordial heave was noted [Normal Rate] : normal [Rhythm Regular] : regular [Normal S1] : normal S1 [Normal S2] : normal S2 [No Gallop] : no gallop heard [S3] : no S3 [S4] : no S4 [I] : a grade 1 [No Pitting Edema] : no pitting edema present [Right Carotid Bruit] : no bruit heard over the right carotid [Left Carotid Bruit] : no bruit heard over the left carotid [Right Femoral Bruit] : no bruit heard over the right femoral artery [Left Femoral Bruit] : no bruit heard over the left femoral artery [2+] : left 2+ [No Abnormalities] : the abdominal aorta was not enlarged and no bruit was heard [Clear Lung Fields] : clear lung fields [Good Air Entry] : good air entry [No Respiratory Distress] : no respiratory distress  [Soft] : abdomen soft [Non Tender] : non-tender [No Masses/organomegaly] : no masses/organomegaly [Normal Bowel Sounds] : normal bowel sounds [Normal Gait] : normal gait [No Edema] : no edema [No Cyanosis] : no cyanosis [No Clubbing] : no clubbing [No Varicosities] : no varicosities [No Rash] : no rash [No Skin Lesions] : no skin lesions [Moves all extremities] : moves all extremities [No Focal Deficits] : no focal deficits [Normal Speech] : normal speech [Alert and Oriented] : alert and oriented [Normal memory] : normal memory

## 2022-03-17 LAB
ALBUMIN SERPL ELPH-MCNC: 4.8 G/DL
ALP BLD-CCNC: 76 U/L
ALT SERPL-CCNC: 35 U/L
ANION GAP SERPL CALC-SCNC: 13 MMOL/L
AST SERPL-CCNC: 23 U/L
BASOPHILS # BLD AUTO: 0.01 K/UL
BASOPHILS NFR BLD AUTO: 0.2 %
BILIRUB SERPL-MCNC: 0.6 MG/DL
BUN SERPL-MCNC: 9 MG/DL
CALCIUM SERPL-MCNC: 9.4 MG/DL
CHLORIDE SERPL-SCNC: 100 MMOL/L
CO2 SERPL-SCNC: 26 MMOL/L
CREAT SERPL-MCNC: 0.95 MG/DL
CRP SERPL-MCNC: 3 MG/L
EGFR: 98 ML/MIN/1.73M2
EOSINOPHIL # BLD AUTO: 0.1 K/UL
EOSINOPHIL NFR BLD AUTO: 2 %
GLUCOSE SERPL-MCNC: 107 MG/DL
HCT VFR BLD CALC: 46.4 %
HGB BLD-MCNC: 15.3 G/DL
IMM GRANULOCYTES NFR BLD AUTO: 0.2 %
LYMPHOCYTES # BLD AUTO: 1.35 K/UL
LYMPHOCYTES NFR BLD AUTO: 27.3 %
MAN DIFF?: NORMAL
MCHC RBC-ENTMCNC: 29.8 PG
MCHC RBC-ENTMCNC: 33 GM/DL
MCV RBC AUTO: 90.3 FL
MONOCYTES # BLD AUTO: 0.44 K/UL
MONOCYTES NFR BLD AUTO: 8.9 %
NEUTROPHILS # BLD AUTO: 3.04 K/UL
NEUTROPHILS NFR BLD AUTO: 61.4 %
PLATELET # BLD AUTO: 321 K/UL
POTASSIUM SERPL-SCNC: 4.4 MMOL/L
PROT SERPL-MCNC: 7.3 G/DL
RBC # BLD: 5.14 M/UL
RBC # FLD: 13.1 %
SODIUM SERPL-SCNC: 139 MMOL/L
WBC # FLD AUTO: 4.95 K/UL

## 2022-05-10 ENCOUNTER — APPOINTMENT (OUTPATIENT)
Dept: RHEUMATOLOGY | Facility: CLINIC | Age: 47
End: 2022-05-10
Payer: COMMERCIAL

## 2022-05-10 VITALS
HEART RATE: 69 BPM | OXYGEN SATURATION: 98 % | SYSTOLIC BLOOD PRESSURE: 135 MMHG | TEMPERATURE: 98.1 F | RESPIRATION RATE: 14 BRPM | DIASTOLIC BLOOD PRESSURE: 85 MMHG

## 2022-05-10 VITALS
OXYGEN SATURATION: 97 % | TEMPERATURE: 98.1 F | DIASTOLIC BLOOD PRESSURE: 84 MMHG | RESPIRATION RATE: 14 BRPM | HEART RATE: 65 BPM | SYSTOLIC BLOOD PRESSURE: 114 MMHG

## 2022-05-10 PROCEDURE — 96365 THER/PROPH/DIAG IV INF INIT: CPT

## 2022-05-10 RX ORDER — VEDOLIZUMAB 300 MG/5ML
300 INJECTION, POWDER, LYOPHILIZED, FOR SOLUTION INTRAVENOUS
Qty: 0 | Refills: 0 | Status: COMPLETED | OUTPATIENT
Start: 2022-03-01

## 2022-05-11 ENCOUNTER — NON-APPOINTMENT (OUTPATIENT)
Age: 47
End: 2022-05-11

## 2022-05-11 LAB
ALBUMIN SERPL ELPH-MCNC: 4.9 G/DL
ALP BLD-CCNC: 80 U/L
ALT SERPL-CCNC: 54 U/L
ANION GAP SERPL CALC-SCNC: 13 MMOL/L
AST SERPL-CCNC: 32 U/L
BASOPHILS # BLD AUTO: 0.01 K/UL
BASOPHILS NFR BLD AUTO: 0.2 %
BILIRUB SERPL-MCNC: 0.7 MG/DL
BUN SERPL-MCNC: 10 MG/DL
CALCIUM SERPL-MCNC: 9.8 MG/DL
CHLORIDE SERPL-SCNC: 102 MMOL/L
CO2 SERPL-SCNC: 27 MMOL/L
CREAT SERPL-MCNC: 0.99 MG/DL
CRP SERPL-MCNC: 4 MG/L
EGFR: 93 ML/MIN/1.73M2
EOSINOPHIL # BLD AUTO: 0.18 K/UL
EOSINOPHIL NFR BLD AUTO: 3.7 %
GLUCOSE SERPL-MCNC: 103 MG/DL
HCT VFR BLD CALC: 46.7 %
HGB BLD-MCNC: 15.7 G/DL
IMM GRANULOCYTES NFR BLD AUTO: 0.2 %
LYMPHOCYTES # BLD AUTO: 1.25 K/UL
LYMPHOCYTES NFR BLD AUTO: 25.5 %
MAN DIFF?: NORMAL
MCHC RBC-ENTMCNC: 30.5 PG
MCHC RBC-ENTMCNC: 33.6 GM/DL
MCV RBC AUTO: 90.7 FL
MONOCYTES # BLD AUTO: 0.5 K/UL
MONOCYTES NFR BLD AUTO: 10.2 %
NEUTROPHILS # BLD AUTO: 2.95 K/UL
NEUTROPHILS NFR BLD AUTO: 60.2 %
PLATELET # BLD AUTO: 332 K/UL
POTASSIUM SERPL-SCNC: 4.7 MMOL/L
PROT SERPL-MCNC: 7.8 G/DL
RBC # BLD: 5.15 M/UL
RBC # FLD: 12.7 %
SODIUM SERPL-SCNC: 141 MMOL/L
WBC # FLD AUTO: 4.9 K/UL

## 2022-05-16 ENCOUNTER — APPOINTMENT (OUTPATIENT)
Dept: CARDIOLOGY | Facility: CLINIC | Age: 47
End: 2022-05-16
Payer: COMMERCIAL

## 2022-05-16 VITALS
WEIGHT: 173 LBS | HEIGHT: 66 IN | BODY MASS INDEX: 27.8 KG/M2 | RESPIRATION RATE: 16 BRPM | DIASTOLIC BLOOD PRESSURE: 80 MMHG | SYSTOLIC BLOOD PRESSURE: 128 MMHG | OXYGEN SATURATION: 96 % | HEART RATE: 80 BPM | TEMPERATURE: 97.7 F

## 2022-05-16 DIAGNOSIS — R07.9 CHEST PAIN, UNSPECIFIED: ICD-10-CM

## 2022-05-16 PROCEDURE — 99214 OFFICE O/P EST MOD 30 MIN: CPT

## 2022-05-16 NOTE — DISCUSSION/SUMMARY
[FreeTextEntry1] : This is a 47-year-old male with past medical history significant of hypertension, ulcerative colitis, dyspepsia, who comes in for cardiac follow-up evaluation.\par He denies shortness of breath, palpitations, dizziness, PND orthopnea or syncope.\par The patient is still complaining of occasional left-sided chest wall pain in the mid axillary area not associated with any particular activity.  He also had an episode of lightheadedness.  He denies chest pressure, shortness of breath, palpitations or syncope.\par He has no history of rheumatic fever.  He does not drink excessive caffeine or alcohol.\par Cardiac risk factors include hypertension, inflammatory bowel disease (is a risk enhancing feature), and positive family history of heart disease (his grandfather had a significant myocardial infarction age 45.\par The patient is on infusion therapy for his ulcerative colitis.\par Electrocardiogram done March 16, 2022 demonstrated normal sinus rhythm rate 65 bpm is otherwise unremarkable.\par His blood pressure is under adequate control on Norvasc 5 mg daily.\par He had blood work done with his primary care physician May 10, 2022 for CBC, and SMA-20 which were within normal limits.\par He will continue on his current diet and exercise program.  His left-sided mid axillary chest discomfort is rare and is consistent musculoskeletal pain.  I have asked him to return to his primary care physician for further investigation.\par He is currently hemodynamically stable and asymptomatic from a cardiac standpoint.\par Blood work done September 29, 2020 demonstrated cholesterol 135, HDL 38, triglycerides 190, LDL cholesterol 71 mg/dL.\par The patient reports that he had a normal stress echocardiogram the week of March 7, 2022.\par He will get me copies of this examination.\par \par The patient understands that aerobic exercises must be increased to 40 minutes 4 times per week. A detailed discussion of lifestyle modification was done today. The patient has a good understanding of the diagnosis, and treatment plan. Lifestyle modification was also outlined.

## 2022-05-16 NOTE — PHYSICAL EXAM
[Well Developed] : well developed [Well Nourished] : well nourished [No Acute Distress] : no acute distress [Normal Conjunctiva] : normal conjunctiva [Normal Venous Pressure] : normal venous pressure [No Carotid Bruit] : no carotid bruit [Normal S1, S2] : normal S1, S2 [No Rub] : no rub [5th Left ICS - MCL] : palpated at the 5th LICS in the midclavicular line [Normal] : normal [No Precordial Heave] : no precordial heave was noted [Normal Rate] : normal [Rhythm Regular] : regular [Normal S1] : normal S1 [Normal S2] : normal S2 [No Gallop] : no gallop heard [I] : a grade 1 [No Pitting Edema] : no pitting edema present [2+] : left 2+ [No Abnormalities] : the abdominal aorta was not enlarged and no bruit was heard [Clear Lung Fields] : clear lung fields [Good Air Entry] : good air entry [No Respiratory Distress] : no respiratory distress  [Soft] : abdomen soft [Non Tender] : non-tender [No Masses/organomegaly] : no masses/organomegaly [Normal Bowel Sounds] : normal bowel sounds [Normal Gait] : normal gait [No Edema] : no edema [No Cyanosis] : no cyanosis [No Clubbing] : no clubbing [No Varicosities] : no varicosities [No Rash] : no rash [No Skin Lesions] : no skin lesions [Moves all extremities] : moves all extremities [No Focal Deficits] : no focal deficits [Normal Speech] : normal speech [Alert and Oriented] : alert and oriented [Normal memory] : normal memory [S3] : no S3 [S4] : no S4 [Right Carotid Bruit] : no bruit heard over the right carotid [Left Carotid Bruit] : no bruit heard over the left carotid [Right Femoral Bruit] : no bruit heard over the right femoral artery [Left Femoral Bruit] : no bruit heard over the left femoral artery

## 2022-07-05 ENCOUNTER — APPOINTMENT (OUTPATIENT)
Dept: RHEUMATOLOGY | Facility: CLINIC | Age: 47
End: 2022-07-05

## 2022-07-05 VITALS
DIASTOLIC BLOOD PRESSURE: 80 MMHG | TEMPERATURE: 98.1 F | OXYGEN SATURATION: 97 % | SYSTOLIC BLOOD PRESSURE: 122 MMHG | RESPIRATION RATE: 14 BRPM | HEART RATE: 66 BPM

## 2022-07-05 VITALS
DIASTOLIC BLOOD PRESSURE: 81 MMHG | HEART RATE: 65 BPM | SYSTOLIC BLOOD PRESSURE: 126 MMHG | RESPIRATION RATE: 14 BRPM | OXYGEN SATURATION: 96 % | TEMPERATURE: 98.1 F

## 2022-07-05 PROCEDURE — 96365 THER/PROPH/DIAG IV INF INIT: CPT

## 2022-07-05 RX ORDER — VEDOLIZUMAB 300 MG/5ML
300 INJECTION, POWDER, LYOPHILIZED, FOR SOLUTION INTRAVENOUS
Qty: 0 | Refills: 0 | Status: COMPLETED | OUTPATIENT
Start: 2022-07-05

## 2022-08-30 ENCOUNTER — APPOINTMENT (OUTPATIENT)
Dept: RHEUMATOLOGY | Facility: CLINIC | Age: 47
End: 2022-08-30

## 2022-08-30 VITALS
HEART RATE: 56 BPM | OXYGEN SATURATION: 99 % | RESPIRATION RATE: 14 BRPM | TEMPERATURE: 98.1 F | DIASTOLIC BLOOD PRESSURE: 81 MMHG | SYSTOLIC BLOOD PRESSURE: 122 MMHG

## 2022-08-30 VITALS
OXYGEN SATURATION: 97 % | DIASTOLIC BLOOD PRESSURE: 84 MMHG | HEART RATE: 64 BPM | RESPIRATION RATE: 14 BRPM | TEMPERATURE: 98.1 F | SYSTOLIC BLOOD PRESSURE: 123 MMHG

## 2022-08-30 PROCEDURE — 96365 THER/PROPH/DIAG IV INF INIT: CPT

## 2022-08-30 RX ORDER — VEDOLIZUMAB 300 MG/5ML
300 INJECTION, POWDER, LYOPHILIZED, FOR SOLUTION INTRAVENOUS
Qty: 0 | Refills: 0 | Status: COMPLETED | OUTPATIENT
Start: 2022-08-26

## 2022-09-01 LAB
ALBUMIN SERPL ELPH-MCNC: 5.2 G/DL
ALP BLD-CCNC: 90 U/L
ALT SERPL-CCNC: 45 U/L
ANION GAP SERPL CALC-SCNC: 14 MMOL/L
AST SERPL-CCNC: 26 U/L
BASOPHILS # BLD AUTO: 0.01 K/UL
BASOPHILS NFR BLD AUTO: 0.2 %
BILIRUB SERPL-MCNC: 0.6 MG/DL
BUN SERPL-MCNC: 12 MG/DL
CALCIUM SERPL-MCNC: 9.8 MG/DL
CHLORIDE SERPL-SCNC: 103 MMOL/L
CO2 SERPL-SCNC: 26 MMOL/L
CREAT SERPL-MCNC: 1.07 MG/DL
CRP SERPL-MCNC: 5 MG/L
EGFR: 85 ML/MIN/1.73M2
EOSINOPHIL # BLD AUTO: 0.12 K/UL
EOSINOPHIL NFR BLD AUTO: 2.3 %
GLUCOSE SERPL-MCNC: 95 MG/DL
HCT VFR BLD CALC: 49.3 %
HGB BLD-MCNC: 15.6 G/DL
IMM GRANULOCYTES NFR BLD AUTO: 0.2 %
LYMPHOCYTES # BLD AUTO: 1.3 K/UL
LYMPHOCYTES NFR BLD AUTO: 25.3 %
MAN DIFF?: NORMAL
MCHC RBC-ENTMCNC: 29.5 PG
MCHC RBC-ENTMCNC: 31.6 GM/DL
MCV RBC AUTO: 93.2 FL
MONOCYTES # BLD AUTO: 0.46 K/UL
MONOCYTES NFR BLD AUTO: 9 %
NEUTROPHILS # BLD AUTO: 3.23 K/UL
NEUTROPHILS NFR BLD AUTO: 63 %
PLATELET # BLD AUTO: 327 K/UL
POTASSIUM SERPL-SCNC: 4.5 MMOL/L
PROT SERPL-MCNC: 7.6 G/DL
RBC # BLD: 5.29 M/UL
RBC # FLD: 13.1 %
SODIUM SERPL-SCNC: 143 MMOL/L
WBC # FLD AUTO: 5.13 K/UL

## 2022-09-06 ENCOUNTER — NON-APPOINTMENT (OUTPATIENT)
Age: 47
End: 2022-09-06

## 2022-09-06 LAB
ANTIBODIES TO VEDOLIZUMAB (ATV) CONCENTRATION: < 1.6 U/ML
PROMETHEUS ANSER VDZ: NORMAL
PROMETHEUS LABORATORY FOOTER: NORMAL
SERUM VEDOLIZUMAB (VDZ) CONCENTRATION: 15.8 UG/ML

## 2022-09-27 ENCOUNTER — LABORATORY RESULT (OUTPATIENT)
Age: 47
End: 2022-09-27

## 2022-09-27 ENCOUNTER — APPOINTMENT (OUTPATIENT)
Dept: CARDIOLOGY | Facility: CLINIC | Age: 47
End: 2022-09-27

## 2022-09-27 VITALS — HEART RATE: 76 BPM

## 2022-09-27 VITALS
RESPIRATION RATE: 16 BRPM | HEIGHT: 66 IN | BODY MASS INDEX: 27.32 KG/M2 | DIASTOLIC BLOOD PRESSURE: 81 MMHG | OXYGEN SATURATION: 98 % | TEMPERATURE: 97.6 F | HEART RATE: 76 BPM | SYSTOLIC BLOOD PRESSURE: 124 MMHG | WEIGHT: 170 LBS

## 2022-09-27 PROCEDURE — 93306 TTE W/DOPPLER COMPLETE: CPT

## 2022-09-27 PROCEDURE — 99214 OFFICE O/P EST MOD 30 MIN: CPT | Mod: 25

## 2022-09-27 PROCEDURE — 93015 CV STRESS TEST SUPVJ I&R: CPT

## 2022-09-27 NOTE — DISCUSSION/SUMMARY
[FreeTextEntry1] : This is a 47-year-old male with past medical history significant of hypertension, ulcerative colitis, dyspepsia, who comes in for cardiac follow-up evaluation.\par He denies shortness of breath, palpitations, dizziness, PND orthopnea or syncope.\par He has no history of rheumatic fever.  He does not drink excessive caffeine or alcohol.\par Cardiac risk factors include hypertension, inflammatory bowel disease (is a risk enhancing feature), and positive family history of heart disease (his grandfather had a significant myocardial infarction age 45.\par The patient is on infusion therapy for his ulcerative colitis.\par The patient had normal exercise stress test September 27, 2022.\par He will have new blood work done today for lipid panel and electrolytes.\par The patient had been complaining of occasional left-sided chest wall pain in the mid axillary area not associated with any particular activity. \par Electrocardiogram done March 16, 2022 demonstrated normal sinus rhythm rate 65 bpm is otherwise unremarkable.\par His blood pressure is under adequate control on Norvasc 5 mg daily.  Every\par Prior echo Doppler examination done at [facility] August 19, 2020 demonstrated physiologic mitral and tricuspid valve regurgitation with normal left ventricular ejection fraction of 61%.\par \par He is currently hemodynamically stable and asymptomatic from a cardiac standpoint.\par Blood work done September 29, 2020 demonstrated cholesterol 135, HDL 38, triglycerides 190, LDL cholesterol 71 mg/dL.\par The patient reports that he had a normal stress echocardiogram the week of March 7, 2022.\par He will get me copies of this examination.\par \par The patient understands that aerobic exercises must be increased to 40 minutes 4 times per week. A detailed discussion of lifestyle modification was done today. The patient has a good understanding of the diagnosis, and treatment plan. Lifestyle modification was also outlined.

## 2022-09-27 NOTE — REASON FOR VISIT
[CV Risk Factors and Non-Cardiac Disease] : CV risk factors and non-cardiac disease [Hypertension] : hypertension [FreeTextEntry1] : This is a 47-year-old male with past medical history significant of hypertension, ulcerative colitis, dyspepsia, who comes in for exercise stress test.\par \par Patient states he is generally feeling well today however, he reports occasional dyspnea on exertion.\par He denies chest pain, lightheadedness, palpitations, dizziness, PND, orthopnea or syncope.\par \par He has no history of rheumatic fever.  He does not drink excessive caffeine or alcohol.\par Cardiac risk factors include hypertension, and positive family history of heart disease (his grandfather had a significant myocardial infarction age 45.\par \par His blood pressure is under adequate control on Norvasc 5 mg daily.\par \par The patient reports that he had a normal stress echocardiogram the week of March 7, 2022.

## 2022-09-28 ENCOUNTER — APPOINTMENT (OUTPATIENT)
Dept: GASTROENTEROLOGY | Facility: CLINIC | Age: 47
End: 2022-09-28

## 2022-09-28 VITALS
TEMPERATURE: 97.8 F | DIASTOLIC BLOOD PRESSURE: 70 MMHG | OXYGEN SATURATION: 97 % | RESPIRATION RATE: 16 BRPM | WEIGHT: 170 LBS | SYSTOLIC BLOOD PRESSURE: 118 MMHG | HEIGHT: 66 IN | BODY MASS INDEX: 27.32 KG/M2 | HEART RATE: 69 BPM

## 2022-09-28 DIAGNOSIS — R51.9 HEADACHE, UNSPECIFIED: ICD-10-CM

## 2022-09-28 DIAGNOSIS — R19.7 DIARRHEA, UNSPECIFIED: ICD-10-CM

## 2022-09-28 DIAGNOSIS — Z01.818 ENCOUNTER FOR OTHER PREPROCEDURAL EXAMINATION: ICD-10-CM

## 2022-09-28 PROCEDURE — 99214 OFFICE O/P EST MOD 30 MIN: CPT

## 2022-09-30 NOTE — HISTORY OF PRESENT ILLNESS
[FreeTextEntry1] : --------CURRENT HISTORY--------\par \par 46 yo M, with PanColitis UC (diagnosed 2003) currently on Entyvio q8 weeks since 12/2020 (with 80% improvement noted prior), HTN, HLD, RINA, GERD / Hiatal Hernia, here for in person follow up\par \par Reports he has no blood or extreme urgency associated with his BM's\par \par He does note about 1-2 BM's per day, notes some bloating, gas, and occasional bouts of diarrhea\par While his normal BM's are formed ("mostly"), his diarrhea episodes are brown watery, can occur once to three times a week\par He mentions he has Gallstones (never biliary colic, found incidentally when evaluated for nephrolithiasis)\par He mentions he has occasional oily slick yellow stools\par \par He has not been able to have the DEXA or Esophagram performed yet\par Last Scope in 10/2020\par Occasionally smokes Cigars\par No fhx of esophageal cancer\par Takes PPI every day in the morning\par \par --------PRIOR HISTORY------------\par 45 yo M with UC on Entyvio q8w since Dec 2020 with 80% improvement in symptoms; HTN, RINA, GERD here for followup. \par Referred by Dr. Majano\par \par Intermittent dysphagia unchanged since last endoscopy showed hiatal hernia despite PPI, heartburn has resolved.\par Having 1-2 formed stool, denies blood or urgency\par Denies fever, chill, cp, sob, abd pain, nausea, vomiting, constipation, diarrhea, odynophagia\par Does report looser stools 1-2x/month after certain meals, cannot predict or identify which meals. + flatulence on occasion from certain foods. Avoids dairy and caffeine. \par CRP normalized.\par \par EIM: denies\par \par Initial Visit\par Symptoms of bloody diarrhea in 2003, diagnosed with UC pancolitis. Since diagnosis he has been on mesalamine on and off. He would take it for a few months and then stop because he felt better. \par \par ENDOSCOPY\par Endoscopy 10/2020: Small hiatal hernia and gastritis\par Colonoscopy in 10/2020- inflammation in rectum and sigmoid \par \par FH: MGF: UC, colon cancer\par Meds: Entyvio, amlodipine, pantoprazole prn. Denies NSAIDs\par Shx: inguinal hernia repair, vasectomy \par Works as  in [place]. \par

## 2022-09-30 NOTE — PHYSICAL EXAM
[Normal] : the sclera and conjunctiva were normal [Hearing Threshold Finger Rub Not [place]] : hearing was normal [Normal Appearance] : the appearance of the neck was normal [No Respiratory Distress] : no respiratory distress [No Acc Muscle Use] : no accessory muscle use [Respiration, Rhythm And Depth] : normal respiratory rhythm and effort [Abdomen Tenderness] : non-tender [No Masses] : no abdominal mass palpated [Abdomen Soft] : soft [Normal Color / Pigmentation] : normal skin color and pigmentation [Normal Affect] : the affect was normal [Recent Memory Normal] : recent memory was not impaired [Normal Mood] : the mood was normal [Remote Memory Normal] : remote memory was not impaired [de-identified] : normal rate [de-identified] : +tattoo

## 2022-09-30 NOTE — ASSESSMENT
[FreeTextEntry1] : 46 yo M, with PanColitis UC (diagnosed 2003) currently on Entyvio q8 weeks since 12/2020 (with 80% improvement noted prior), HTN, HLD, RINA, GERD / Hiatal Hernia, here for in person follow up\par \par #PanColitis UC on Entyvio q8 weeks\par - recent chem and cbc wnl, recent VDZ Levels show no Ab's, Conc 15.8 from 31.4\par - with some persistent symptoms, recent CRP 8/30/22 of 5\par - Last C scope 10/2020, will plan for repeat Colonoscope (+/- EGD)\par - Due for C scope, plan with Miralax prep, COVID test ordered\par \par #Oily Stools\par - regarding his loose oily stools, would consider evaluation for other sources than his IBD such as pancreatic insufficiency \par - will send fecal elastase\par - if colon improved and elastase ok, consideration for fiber supplements and nutrition referral (admits has poor diet)\par - weight notably has been stable\par \par #GERD/Hiatal Hernia\par - on PPI daily, still with sour taste in mouth / heartburn\par - prior recommended Esophagram \par - plan for repeat EGD concurrent with Colonoscopy\par -  Male (near 48)\par \par #Headaches\par - Will refer to Neurology, noted history of 9/11 exposure\par \par Seen and examined with Dr. Johnson\par \par Onur Villeda MD\par GI Fellow\par [facility] Gastroenterology

## 2022-10-25 ENCOUNTER — APPOINTMENT (OUTPATIENT)
Dept: RHEUMATOLOGY | Facility: CLINIC | Age: 47
End: 2022-10-25

## 2022-10-25 VITALS
TEMPERATURE: 97.1 F | OXYGEN SATURATION: 97 % | HEART RATE: 77 BPM | RESPIRATION RATE: 14 BRPM | DIASTOLIC BLOOD PRESSURE: 81 MMHG | SYSTOLIC BLOOD PRESSURE: 119 MMHG

## 2022-10-25 VITALS
DIASTOLIC BLOOD PRESSURE: 80 MMHG | TEMPERATURE: 97.1 F | RESPIRATION RATE: 14 BRPM | HEART RATE: 69 BPM | SYSTOLIC BLOOD PRESSURE: 115 MMHG | OXYGEN SATURATION: 96 %

## 2022-10-25 PROCEDURE — 96365 THER/PROPH/DIAG IV INF INIT: CPT

## 2022-10-25 RX ORDER — VEDOLIZUMAB 300 MG/5ML
300 INJECTION, POWDER, LYOPHILIZED, FOR SOLUTION INTRAVENOUS
Qty: 0 | Refills: 0 | Status: COMPLETED | OUTPATIENT
Start: 2022-10-25

## 2022-10-25 RX ADMIN — VEDOLIZUMAB 1 MG: 300 INJECTION, POWDER, LYOPHILIZED, FOR SOLUTION INTRAVENOUS at 00:00

## 2022-10-26 LAB
ALBUMIN SERPL ELPH-MCNC: 4.5 G/DL
ALP BLD-CCNC: 85 U/L
ALT SERPL-CCNC: 37 U/L
ANION GAP SERPL CALC-SCNC: 12 MMOL/L
AST SERPL-CCNC: 26 U/L
BASOPHILS # BLD AUTO: 0.02 K/UL
BASOPHILS NFR BLD AUTO: 0.4 %
BILIRUB SERPL-MCNC: 0.8 MG/DL
BUN SERPL-MCNC: 10 MG/DL
CALCIUM SERPL-MCNC: 9.3 MG/DL
CHLORIDE SERPL-SCNC: 104 MMOL/L
CO2 SERPL-SCNC: 22 MMOL/L
CREAT SERPL-MCNC: 0.91 MG/DL
CRP SERPL-MCNC: 4 MG/L
EGFR: 103 ML/MIN/1.73M2
EOSINOPHIL # BLD AUTO: 0.11 K/UL
EOSINOPHIL NFR BLD AUTO: 2.2 %
GLUCOSE SERPL-MCNC: 108 MG/DL
HCT VFR BLD CALC: 45.2 %
HGB BLD-MCNC: 15.3 G/DL
IMM GRANULOCYTES NFR BLD AUTO: 0.2 %
LYMPHOCYTES # BLD AUTO: 1.16 K/UL
LYMPHOCYTES NFR BLD AUTO: 23.2 %
MAN DIFF?: NORMAL
MCHC RBC-ENTMCNC: 30.3 PG
MCHC RBC-ENTMCNC: 33.8 GM/DL
MCV RBC AUTO: 89.5 FL
MONOCYTES # BLD AUTO: 0.45 K/UL
MONOCYTES NFR BLD AUTO: 9 %
NEUTROPHILS # BLD AUTO: 3.25 K/UL
NEUTROPHILS NFR BLD AUTO: 65 %
PLATELET # BLD AUTO: 316 K/UL
POTASSIUM SERPL-SCNC: 4.6 MMOL/L
PROT SERPL-MCNC: 7.1 G/DL
RBC # BLD: 5.05 M/UL
RBC # FLD: 13.2 %
SODIUM SERPL-SCNC: 138 MMOL/L
WBC # FLD AUTO: 5 K/UL

## 2022-11-21 ENCOUNTER — RESULT REVIEW (OUTPATIENT)
Age: 47
End: 2022-11-21

## 2022-11-25 ENCOUNTER — NON-APPOINTMENT (OUTPATIENT)
Age: 47
End: 2022-11-25

## 2022-11-25 ENCOUNTER — LABORATORY RESULT (OUTPATIENT)
Age: 47
End: 2022-11-25

## 2022-11-25 ENCOUNTER — OUTPATIENT (OUTPATIENT)
Dept: OUTPATIENT SERVICES | Facility: HOSPITAL | Age: 47
LOS: 1 days | End: 2022-11-25
Payer: COMMERCIAL

## 2022-11-25 ENCOUNTER — APPOINTMENT (OUTPATIENT)
Dept: RADIOLOGY | Facility: HOSPITAL | Age: 47
End: 2022-11-25

## 2022-11-25 DIAGNOSIS — R13.10 DYSPHAGIA, UNSPECIFIED: ICD-10-CM

## 2022-11-25 PROCEDURE — 74221 X-RAY XM ESOPHAGUS 2CNTRST: CPT | Mod: 26

## 2022-11-25 PROCEDURE — 74221 X-RAY XM ESOPHAGUS 2CNTRST: CPT

## 2022-11-29 ENCOUNTER — OUTPATIENT (OUTPATIENT)
Dept: OUTPATIENT SERVICES | Facility: HOSPITAL | Age: 47
LOS: 1 days | Discharge: HOME | End: 2022-11-29
Payer: COMMERCIAL

## 2022-11-29 ENCOUNTER — RESULT REVIEW (OUTPATIENT)
Age: 47
End: 2022-11-29

## 2022-11-29 ENCOUNTER — APPOINTMENT (OUTPATIENT)
Dept: GASTROENTEROLOGY | Facility: HOSPITAL | Age: 47
End: 2022-11-29

## 2022-11-29 VITALS
RESPIRATION RATE: 16 BRPM | HEART RATE: 69 BPM | HEIGHT: 66 IN | OXYGEN SATURATION: 97 % | TEMPERATURE: 98 F | SYSTOLIC BLOOD PRESSURE: 118 MMHG | DIASTOLIC BLOOD PRESSURE: 70 MMHG | WEIGHT: 169.98 LBS

## 2022-11-29 PROCEDURE — 43239 EGD BIOPSY SINGLE/MULTIPLE: CPT

## 2022-11-29 PROCEDURE — 88305 TISSUE EXAM BY PATHOLOGIST: CPT | Mod: 26

## 2022-11-29 PROCEDURE — 45380 COLONOSCOPY AND BIOPSY: CPT

## 2022-11-29 PROCEDURE — 88305 TISSUE EXAM BY PATHOLOGIST: CPT

## 2022-11-29 NOTE — PRE-ANESTHESIA EVALUATION ADULT - NS MD HP INPLANTS MED DEV
None
Alert-The patient is alert, awake and responds to voice. The patient is oriented to time, place, and person. The triage nurse is able to obtain subjective information.

## 2022-11-29 NOTE — PRE-ANESTHESIA EVALUATION ADULT - NSANTHPEFT_GEN_ALL_CORE
57
General: Appearance is consistent with chronological age. No abnormal facies.  Constitutional: Alert and in no acute distress.  Eyes: The sclera and conjunctiva were normal, pupils were equal in size, round, and reactive to light and extraocular movements were intact.   ENT: The ears and nose were normal in appearance; oropharynx clear, moist mucus membranes.  Neck: The appearance of the neck was normal, no neck mass was observed. There was no jugular-venous distention.   Airway:  See Mallampati score.  Cardiovascular:  Regular rate and rhythm.  Respiratory: Unlabored breathing.  Neurological: No focal deficit, moves all extremities.  Psychiatric: Oriented to person, place, and time, insight and judgment were intact and the affect was normal.  Exercise Tolerance: MET>4.

## 2022-11-29 NOTE — PRE-ANESTHESIA EVALUATION ADULT - NSANTHHOMEMEDSFT_GEN_ALL_CORE
amLODIPine Besylate 5 MG Oral Tablet; take one pill daily p.o  Pantoprazole Sodium 20 MG Oral Tablet Delayed Release

## 2022-11-29 NOTE — PRE-ANESTHESIA EVALUATION ADULT - NSANTHPMHFT_GEN_ALL_CORE
PMHX:    Ulcerative Colitis  GERD  Dysphagia  Heart  Murmur  HTN  Cholelithiasis  Nephrolithiasis  RINA  HLD  Hiatal Hernia    HPI:  46 yo M, with PanColitis UC (diagnosed 2003) currently on Entyvio q8 weeks since 12/2020 (with 80% improvement noted prior), HTN, HLD, RINA, GERD / Hiatal Hernia, here for in person follow up PMHX:    Ulcerative Colitis  GERD  Dysphagia  Heart  Murmur  HTN  Cholelithiasis  Nephrolithiasis  RINA  HLD  Hiatal Hernia    PSH:  Henia Repair  Vasectomy      HPI:  48 yo M, with PanColitis UC (diagnosed 2003) currently on Entyvio q8 weeks since 12/2020 (with 80% improvement noted prior), HTN, HLD, RINA, GERD / Hiatal Hernia, here for in person follow up

## 2022-12-02 LAB — SURGICAL PATHOLOGY STUDY: SIGNIFICANT CHANGE UP

## 2022-12-20 ENCOUNTER — APPOINTMENT (OUTPATIENT)
Dept: RHEUMATOLOGY | Facility: CLINIC | Age: 47
End: 2022-12-20

## 2022-12-20 VITALS
HEART RATE: 65 BPM | DIASTOLIC BLOOD PRESSURE: 92 MMHG | RESPIRATION RATE: 14 BRPM | OXYGEN SATURATION: 99 % | TEMPERATURE: 97.1 F | SYSTOLIC BLOOD PRESSURE: 133 MMHG

## 2022-12-20 VITALS
HEART RATE: 66 BPM | TEMPERATURE: 97.1 F | SYSTOLIC BLOOD PRESSURE: 135 MMHG | RESPIRATION RATE: 14 BRPM | OXYGEN SATURATION: 99 % | DIASTOLIC BLOOD PRESSURE: 86 MMHG

## 2022-12-20 PROCEDURE — 96365 THER/PROPH/DIAG IV INF INIT: CPT

## 2022-12-20 RX ORDER — VEDOLIZUMAB 300 MG/5ML
300 INJECTION, POWDER, LYOPHILIZED, FOR SOLUTION INTRAVENOUS
Qty: 0 | Refills: 0 | Status: COMPLETED | OUTPATIENT
Start: 2022-12-15

## 2022-12-20 NOTE — HISTORY OF PRESENT ILLNESS
[Denies] : Denies [No] : No [Yes] : Yes [Informed consent documented in EHR.] : Informed consent documented in EHR. [TB] : Tuberculosis screening [Hep acute panel] : Hepatitis acute panel [Left upper extremity] : Left upper extremity [22g] : 22g [Start Time: ___] : Medication Start Time: [unfilled] [End Time: ___] : Medication End Time: [unfilled] [IV discontinued. Intact. No signs or symptoms of IV complications noted. Time: ___] : IV discontinued. Intact. No signs or symptoms of IV complications noted. Time: [unfilled] [Patient  instructed to seek medical attention with signs and symptoms of adverse effects] : Patient  instructed to seek medical attention with signs and symptoms of adverse effects [Patient left unit in no acute distress] : Patient left unit in no acute distress [Medications administered as ordered and tolerated well.] : Medications administered as ordered and tolerated well. [Blood drawn at time of visit] : Blood drawn at time of visit [de-identified] : [id number]

## 2022-12-21 ENCOUNTER — APPOINTMENT (OUTPATIENT)
Dept: GASTROENTEROLOGY | Facility: CLINIC | Age: 47
End: 2022-12-21

## 2022-12-21 VITALS
WEIGHT: 170 LBS | SYSTOLIC BLOOD PRESSURE: 125 MMHG | BODY MASS INDEX: 27.32 KG/M2 | DIASTOLIC BLOOD PRESSURE: 80 MMHG | HEIGHT: 66 IN | OXYGEN SATURATION: 97 % | TEMPERATURE: 96.5 F | HEART RATE: 72 BPM | RESPIRATION RATE: 14 BRPM

## 2022-12-21 DIAGNOSIS — R29.898 OTHER SYMPTOMS AND SIGNS INVOLVING THE MUSCULOSKELETAL SYSTEM: ICD-10-CM

## 2022-12-21 DIAGNOSIS — K90.9 INTESTINAL MALABSORPTION, UNSPECIFIED: ICD-10-CM

## 2022-12-21 LAB
ALBUMIN SERPL ELPH-MCNC: 4.7 G/DL
ALP BLD-CCNC: 86 U/L
ALT SERPL-CCNC: 44 U/L
ANION GAP SERPL CALC-SCNC: 10 MMOL/L
AST SERPL-CCNC: 28 U/L
BASOPHILS # BLD AUTO: 0.01 K/UL
BASOPHILS NFR BLD AUTO: 0.2 %
BILIRUB SERPL-MCNC: 0.6 MG/DL
BUN SERPL-MCNC: 12 MG/DL
CALCIUM SERPL-MCNC: 9.9 MG/DL
CHLORIDE SERPL-SCNC: 99 MMOL/L
CO2 SERPL-SCNC: 29 MMOL/L
CREAT SERPL-MCNC: 1.03 MG/DL
CRP SERPL-MCNC: 4 MG/L
EGFR: 89 ML/MIN/1.73M2
EOSINOPHIL # BLD AUTO: 0.14 K/UL
EOSINOPHIL NFR BLD AUTO: 2.6 %
GLUCOSE SERPL-MCNC: 110 MG/DL
HCT VFR BLD CALC: 47.2 %
HGB BLD-MCNC: 15.8 G/DL
IMM GRANULOCYTES NFR BLD AUTO: 0.2 %
LYMPHOCYTES # BLD AUTO: 1.35 K/UL
LYMPHOCYTES NFR BLD AUTO: 25.3 %
MAN DIFF?: NORMAL
MCHC RBC-ENTMCNC: 30.6 PG
MCHC RBC-ENTMCNC: 33.5 GM/DL
MCV RBC AUTO: 91.5 FL
MONOCYTES # BLD AUTO: 0.5 K/UL
MONOCYTES NFR BLD AUTO: 9.4 %
NEUTROPHILS # BLD AUTO: 3.32 K/UL
NEUTROPHILS NFR BLD AUTO: 62.3 %
PLATELET # BLD AUTO: 319 K/UL
POTASSIUM SERPL-SCNC: 4.9 MMOL/L
PROT SERPL-MCNC: 7.5 G/DL
RBC # BLD: 5.16 M/UL
RBC # FLD: 12.5 %
SODIUM SERPL-SCNC: 138 MMOL/L
WBC # FLD AUTO: 5.33 K/UL

## 2022-12-21 PROCEDURE — 99214 OFFICE O/P EST MOD 30 MIN: CPT

## 2022-12-23 NOTE — HISTORY OF PRESENT ILLNESS
[FreeTextEntry1] : 47M with UC pancolitis (diagnosed 2003) currently on Entyvio q8 weeks since 12/2020 who presents for post-procedure follow up. EGD showed hiatal hernia, granularity in the stomach, and duodenitis. His colonoscopy appeared normal throughout with some slight erythema in the TI, which was normal on biopsy. He currently feels well, and his only symptoms are random bouts of oily diarrhea that occur without any correlation to food/drink or other inciting factors. He also states that he has difficulty grasping things sometimes due to weakness/pain. His joints do not hurt, but it is just this hand weakness. His reflux/heartburn symptoms are well controlled on PPI once daily. Whenever he stops PPI, he does have return of symptoms after a few days. \par \par Prior Hx (9/22): \par 46 yo M, with PanColitis UC (diagnosed 2003) currently on Entyvio q8 weeks since 12/2020 (with 80% improvement noted prior), HTN, HLD, RINA, GERD / Hiatal Hernia, here for in person follow up\par \par Reports he has no blood or extreme urgency associated with his BM's\par \par He does note about 1-2 BM's per day, notes some bloating, gas, and occasional bouts of diarrhea\par While his normal BM's are formed ("mostly"), his diarrhea episodes are brown watery, can occur once to three times a week\par He mentions he has Gallstones (never biliary colic, found incidentally when evaluated for nephrolithiasis)\par He mentions he has occasional oily slick yellow stools\par \par He has not been able to have the DEXA or Esophagram performed yet\par Last Scope in 10/2020\par Occasionally smokes Cigars\par No fhx of esophageal cancer\par Takes PPI every day in the morning

## 2022-12-23 NOTE — ASSESSMENT
[FreeTextEntry1] : 47M with UC pancolitis (diagnosed 2003) currently on Entyvio q8 weeks since 12/2020 who presents for post-procedure follow up. \par \par #UC pancolitis\par - appears to be in deep remission based on most recent colonoscopy and pathology\par - continue VDZ\par - next c'scope in 1-2 years\par - doesn't want flu; UTD on covid vax booster\par \par #GERD\par - continue PPI, advised that he can trial famotidine instead but if return of symptoms then PPI daily will be more long term mgmt\par \par #Oily stool\par - given that he has BM's mostly while at work during the week, and can only collect stool at home on the weekend, advised refrigeration of sample until Monday if collected on weekend to r/o pancreatic insufficiency though low concern as etiology\par - referral to Nutrition placed\par \par #Decreased  strength\par - referral to Ortho hand, ?of L sided thenar atrophy \par - does not appear to be inflammatory process of EIM \par \par Caitie Frost MD\par PGY-6, Gastroenterology Fellow

## 2022-12-23 NOTE — PHYSICAL EXAM
[Alert] : alert [No Acute Distress] : no acute distress [Sclera] : the sclera and conjunctiva were normal [No Respiratory Distress] : no respiratory distress [No Acc Muscle Use] : no accessory muscle use [Respiration, Rhythm And Depth] : normal respiratory rhythm and effort [Heart Rate And Rhythm] : heart rate was normal and rhythm regular [Abdomen Tenderness] : non-tender [Abdomen Soft] : soft [] : no rash [No Focal Deficits] : no focal deficits [Oriented To Time, Place, And Person] : oriented to person, place, and time [de-identified] : +mild left thenar atrophy

## 2023-02-14 ENCOUNTER — APPOINTMENT (OUTPATIENT)
Dept: RHEUMATOLOGY | Facility: CLINIC | Age: 48
End: 2023-02-14
Payer: COMMERCIAL

## 2023-02-14 VITALS
TEMPERATURE: 98.7 F | SYSTOLIC BLOOD PRESSURE: 114 MMHG | RESPIRATION RATE: 14 BRPM | HEART RATE: 67 BPM | OXYGEN SATURATION: 95 % | DIASTOLIC BLOOD PRESSURE: 77 MMHG

## 2023-02-14 VITALS
RESPIRATION RATE: 14 BRPM | TEMPERATURE: 98 F | SYSTOLIC BLOOD PRESSURE: 119 MMHG | HEART RATE: 79 BPM | DIASTOLIC BLOOD PRESSURE: 89 MMHG | OXYGEN SATURATION: 96 %

## 2023-02-14 PROCEDURE — 96365 THER/PROPH/DIAG IV INF INIT: CPT

## 2023-02-14 RX ORDER — VEDOLIZUMAB 300 MG/5ML
300 INJECTION, POWDER, LYOPHILIZED, FOR SOLUTION INTRAVENOUS
Qty: 0 | Refills: 0 | Status: COMPLETED | OUTPATIENT
Start: 2023-02-09

## 2023-02-14 NOTE — HISTORY OF PRESENT ILLNESS
[N/A] : N/A [Denies] : Denies [No] : No [Yes] : Yes [Declined] : Declined [Informed consent documented in EHR.] : Informed consent documented in EHR. [Left upper extremity] : Left upper extremity [22g] : 22g [Start Time: ___] : Medication Start Time: [unfilled] [End Time: ___] : Medication End Time: [unfilled] [IV discontinued. Intact. No signs or symptoms of IV complications noted. Time: ___] : IV discontinued. Intact. No signs or symptoms of IV complications noted. Time: [unfilled] [Patient  instructed to seek medical attention with signs and symptoms of adverse effects] : Patient  instructed to seek medical attention with signs and symptoms of adverse effects [Patient left unit in no acute distress] : Patient left unit in no acute distress [Medications administered as ordered and tolerated well.] : Medications administered as ordered and tolerated well. [de-identified] : 9:17am

## 2023-03-03 ENCOUNTER — LABORATORY RESULT (OUTPATIENT)
Age: 48
End: 2023-03-03

## 2023-03-03 ENCOUNTER — NON-APPOINTMENT (OUTPATIENT)
Age: 48
End: 2023-03-03

## 2023-03-03 ENCOUNTER — APPOINTMENT (OUTPATIENT)
Dept: CARDIOLOGY | Facility: CLINIC | Age: 48
End: 2023-03-03
Payer: COMMERCIAL

## 2023-03-03 VITALS
DIASTOLIC BLOOD PRESSURE: 90 MMHG | SYSTOLIC BLOOD PRESSURE: 126 MMHG | OXYGEN SATURATION: 99 % | HEIGHT: 66 IN | WEIGHT: 170 LBS | HEART RATE: 59 BPM | BODY MASS INDEX: 27.32 KG/M2 | RESPIRATION RATE: 16 BRPM | TEMPERATURE: 98.1 F

## 2023-03-03 PROCEDURE — 99213 OFFICE O/P EST LOW 20 MIN: CPT | Mod: 25

## 2023-03-03 PROCEDURE — 93000 ELECTROCARDIOGRAM COMPLETE: CPT

## 2023-03-03 NOTE — REASON FOR VISIT
[CV Risk Factors and Non-Cardiac Disease] : CV risk factors and non-cardiac disease [Hypertension] : hypertension [FreeTextEntry1] : This is a 48-year-old male with past medical history significant of hypertension, ulcerative colitis, dyspepsia, pre-DM, s/p inguinal hernia repair, s/p COVID Dec 2021 who comes in for cardiac evaluation. \par \par Patient states he is generally feeling well today. He denies chest pain, lightheadedness, palpitations, dizziness, leg swelling, orthopnea or syncope.\par \par He states allergies to Vicodin, but denies drinking or smoking. \par \par Cardiac risk factors include hypertension, and positive family history of heart disease (his grandfather had a significant myocardial infarction age 45. His father has hypertension)\par \par On 9/27/22, his cholesterol was 150, HDL 43, triglycerides 73, LDL direct 90, A1c 5.8%

## 2023-03-03 NOTE — DISCUSSION/SUMMARY
[FreeTextEntry1] : This is a 48-year-old male with past medical history significant of hypertension, ulcerative colitis, status post COVID-19 infection December 2021, dyspepsia, who comes in for cardiac follow-up evaluation.\par He denies shortness of breath, palpitations, dizziness, PND orthopnea or syncope.\par He has no history of rheumatic fever.  He does not drink excessive caffeine or alcohol.\par Cardiac risk factors include hypertension, inflammatory bowel disease (is a risk enhancing feature), and positive family history of heart disease (his grandfather had a significant myocardial infarction age 45.\par The patient is on infusion therapy for his ulcerative colitis.\par The patient's blood pressure today is slightly elevated but he reports that he is always within normal limits prior to his infusions.\par Electrocardiogram done March 3, 2023 demonstrates sinus bradycardia rate of 59 bpm is otherwise unremarkable.\par The patient will have blood work today for her lipid profile.\par He is instructed to follow-up with his primary care physician.  He will continue to monitor his blood pressure and remain on Norvasc 5 mg daily.\par The patient had normal exercise stress test September 27, 2022.\par \par Electrocardiogram done March 16, 2022 demonstrated normal sinus rhythm rate 65 bpm is otherwise unremarkable.\par His blood pressure is under adequate control on Norvasc 5 mg daily.\par Prior echo Doppler examination done at [facility] August 19, 2020 demonstrated physiologic mitral and tricuspid valve regurgitation with normal left ventricular ejection fraction of 61%.\par \par He is currently hemodynamically stable and asymptomatic from a cardiac standpoint.\par Blood work done September 29, 2020 demonstrated cholesterol 135, HDL 38, triglycerides 190, LDL cholesterol 71 mg/dL.\par The patient reports that he had a normal stress echocardiogram the week of March 7, 2022.\par He will get me copies of this examination.\par \par The patient understands that aerobic exercises must be increased to 40 minutes 4 times per week. A detailed discussion of lifestyle modification was done today. The patient has a good understanding of the diagnosis, and treatment plan. Lifestyle modification was also outlined.

## 2023-03-06 RX ORDER — PANTOPRAZOLE 20 MG/1
20 TABLET, DELAYED RELEASE ORAL
Refills: 0 | Status: COMPLETED | COMMUNITY
End: 2023-03-06

## 2023-04-04 ENCOUNTER — APPOINTMENT (OUTPATIENT)
Dept: RHEUMATOLOGY | Facility: CLINIC | Age: 48
End: 2023-04-04
Payer: COMMERCIAL

## 2023-04-04 VITALS
RESPIRATION RATE: 16 BRPM | HEART RATE: 68 BPM | DIASTOLIC BLOOD PRESSURE: 85 MMHG | OXYGEN SATURATION: 98 % | SYSTOLIC BLOOD PRESSURE: 126 MMHG | TEMPERATURE: 97.3 F

## 2023-04-04 VITALS
RESPIRATION RATE: 16 BRPM | TEMPERATURE: 97.7 F | SYSTOLIC BLOOD PRESSURE: 118 MMHG | HEART RATE: 67 BPM | DIASTOLIC BLOOD PRESSURE: 78 MMHG | OXYGEN SATURATION: 96 %

## 2023-04-04 PROCEDURE — 36415 COLL VENOUS BLD VENIPUNCTURE: CPT

## 2023-04-04 PROCEDURE — 96365 THER/PROPH/DIAG IV INF INIT: CPT

## 2023-04-04 RX ORDER — VEDOLIZUMAB 300 MG/5ML
300 INJECTION, POWDER, LYOPHILIZED, FOR SOLUTION INTRAVENOUS
Qty: 0 | Refills: 0 | Status: COMPLETED | OUTPATIENT
Start: 2023-03-31

## 2023-04-04 NOTE — HISTORY OF PRESENT ILLNESS
[N/A] : N/A [Denies] : Denies [Yes] : Yes [Declined] : Declined [Informed consent documented in EHR.] : Informed consent documented in EHR. [Left upper extremity] : Left upper extremity [22g] : 22g [Start Time: ___] : Medication Start Time: [unfilled] [End Time: ___] : Medication End Time: [unfilled] [IV discontinued. Intact. No signs or symptoms of IV complications noted. Time: ___] : IV discontinued. Intact. No signs or symptoms of IV complications noted. Time: [unfilled] [Patient  instructed to seek medical attention with signs and symptoms of adverse effects] : Patient  instructed to seek medical attention with signs and symptoms of adverse effects [Patient left unit in no acute distress] : Patient left unit in no acute distress [Medications administered as ordered and tolerated well.] : Medications administered as ordered and tolerated well. [Blood drawn at time of visit] : Blood drawn at time of visit [de-identified] : [id number]

## 2023-04-05 LAB
ALBUMIN SERPL ELPH-MCNC: 4.7 G/DL
ALP BLD-CCNC: 82 U/L
ALT SERPL-CCNC: 29 U/L
ANION GAP SERPL CALC-SCNC: 15 MMOL/L
AST SERPL-CCNC: 21 U/L
BASOPHILS # BLD AUTO: 0.02 K/UL
BASOPHILS NFR BLD AUTO: 0.4 %
BILIRUB SERPL-MCNC: 0.7 MG/DL
BUN SERPL-MCNC: 13 MG/DL
CALCIUM SERPL-MCNC: 9.4 MG/DL
CHLORIDE SERPL-SCNC: 100 MMOL/L
CO2 SERPL-SCNC: 26 MMOL/L
CREAT SERPL-MCNC: 0.99 MG/DL
CRP SERPL-MCNC: 5 MG/L
EGFR: 93 ML/MIN/1.73M2
EOSINOPHIL # BLD AUTO: 0.19 K/UL
EOSINOPHIL NFR BLD AUTO: 3.6 %
GLUCOSE SERPL-MCNC: 108 MG/DL
HCT VFR BLD CALC: 46.1 %
HGB BLD-MCNC: 15.1 G/DL
IMM GRANULOCYTES NFR BLD AUTO: 0.2 %
LYMPHOCYTES # BLD AUTO: 1.41 K/UL
LYMPHOCYTES NFR BLD AUTO: 26.8 %
MAN DIFF?: NORMAL
MCHC RBC-ENTMCNC: 29.6 PG
MCHC RBC-ENTMCNC: 32.8 GM/DL
MCV RBC AUTO: 90.4 FL
MONOCYTES # BLD AUTO: 0.45 K/UL
MONOCYTES NFR BLD AUTO: 8.6 %
NEUTROPHILS # BLD AUTO: 3.18 K/UL
NEUTROPHILS NFR BLD AUTO: 60.4 %
PLATELET # BLD AUTO: 319 K/UL
POTASSIUM SERPL-SCNC: 4.6 MMOL/L
PROT SERPL-MCNC: 7.5 G/DL
RBC # BLD: 5.1 M/UL
RBC # FLD: 13.1 %
SODIUM SERPL-SCNC: 141 MMOL/L
WBC # FLD AUTO: 5.26 K/UL

## 2023-04-10 LAB
ANTIBODIES TO VEDOLIZUMAB (ATV) CONCENTRATION: < 1.6 U/ML
PROMETHEUS ANSER VDZ: NORMAL
PROMETHEUS LABORATORY FOOTER: NORMAL
SERUM VEDOLIZUMAB (VDZ) CONCENTRATION: 17.8 UG/ML

## 2023-05-30 ENCOUNTER — APPOINTMENT (OUTPATIENT)
Dept: RHEUMATOLOGY | Facility: CLINIC | Age: 48
End: 2023-05-30
Payer: COMMERCIAL

## 2023-05-30 VITALS
OXYGEN SATURATION: 98 % | DIASTOLIC BLOOD PRESSURE: 74 MMHG | RESPIRATION RATE: 16 BRPM | SYSTOLIC BLOOD PRESSURE: 113 MMHG | TEMPERATURE: 97.8 F | HEART RATE: 62 BPM

## 2023-05-30 VITALS
TEMPERATURE: 97.9 F | DIASTOLIC BLOOD PRESSURE: 86 MMHG | SYSTOLIC BLOOD PRESSURE: 124 MMHG | OXYGEN SATURATION: 97 % | RESPIRATION RATE: 16 BRPM | HEART RATE: 74 BPM

## 2023-05-30 PROCEDURE — 96365 THER/PROPH/DIAG IV INF INIT: CPT

## 2023-05-30 PROCEDURE — 36415 COLL VENOUS BLD VENIPUNCTURE: CPT

## 2023-05-30 RX ORDER — VEDOLIZUMAB 300 MG/5ML
300 INJECTION, POWDER, LYOPHILIZED, FOR SOLUTION INTRAVENOUS
Qty: 0 | Refills: 0 | Status: COMPLETED | OUTPATIENT
Start: 2023-05-25

## 2023-05-30 NOTE — HISTORY OF PRESENT ILLNESS
[N/A] : N/A [Denies] : Denies [No] : No [Yes] : Yes [Declined] : Declined [Informed consent documented in EHR.] : Informed consent documented in EHR. [Left upper extremity] : Left upper extremity [22g] : 22g [Start Time: ___] : Medication Start Time: [unfilled] [End Time: ___] : Medication End Time: [unfilled] [IV discontinued. Intact. No signs or symptoms of IV complications noted. Time: ___] : IV discontinued. Intact. No signs or symptoms of IV complications noted. Time: [unfilled] [Patient  instructed to seek medical attention with signs and symptoms of adverse effects] : Patient  instructed to seek medical attention with signs and symptoms of adverse effects [Patient left unit in no acute distress] : Patient left unit in no acute distress [Medications administered as ordered and tolerated well.] : Medications administered as ordered and tolerated well. [Blood drawn at time of visit] : Blood drawn at time of visit [de-identified] : [id number]

## 2023-06-01 ENCOUNTER — NON-APPOINTMENT (OUTPATIENT)
Age: 48
End: 2023-06-01

## 2023-06-01 LAB
ALBUMIN SERPL ELPH-MCNC: 4.7 G/DL
ALP BLD-CCNC: 103 U/L
ALT SERPL-CCNC: 19 U/L
ANION GAP SERPL CALC-SCNC: 14 MMOL/L
AST SERPL-CCNC: 18 U/L
BILIRUB SERPL-MCNC: 0.8 MG/DL
BUN SERPL-MCNC: 12 MG/DL
CALCIUM SERPL-MCNC: 9.7 MG/DL
CHLORIDE SERPL-SCNC: 101 MMOL/L
CO2 SERPL-SCNC: 27 MMOL/L
CREAT SERPL-MCNC: 1 MG/DL
CRP SERPL-MCNC: 24 MG/L
EGFR: 92 ML/MIN/1.73M2
GLUCOSE SERPL-MCNC: 133 MG/DL
POTASSIUM SERPL-SCNC: 4.8 MMOL/L
PROT SERPL-MCNC: 7.8 G/DL
SODIUM SERPL-SCNC: 141 MMOL/L

## 2023-07-25 ENCOUNTER — APPOINTMENT (OUTPATIENT)
Dept: RHEUMATOLOGY | Facility: CLINIC | Age: 48
End: 2023-07-25
Payer: COMMERCIAL

## 2023-07-25 VITALS
DIASTOLIC BLOOD PRESSURE: 74 MMHG | HEART RATE: 65 BPM | TEMPERATURE: 97.9 F | SYSTOLIC BLOOD PRESSURE: 117 MMHG | RESPIRATION RATE: 16 BRPM | OXYGEN SATURATION: 98 %

## 2023-07-25 VITALS
SYSTOLIC BLOOD PRESSURE: 121 MMHG | DIASTOLIC BLOOD PRESSURE: 80 MMHG | RESPIRATION RATE: 16 BRPM | HEART RATE: 67 BPM | TEMPERATURE: 97.9 F | OXYGEN SATURATION: 96 %

## 2023-07-25 PROCEDURE — 96365 THER/PROPH/DIAG IV INF INIT: CPT

## 2023-07-25 PROCEDURE — 36415 COLL VENOUS BLD VENIPUNCTURE: CPT

## 2023-07-25 RX ORDER — VEDOLIZUMAB 300 MG/5ML
300 INJECTION, POWDER, LYOPHILIZED, FOR SOLUTION INTRAVENOUS
Qty: 0 | Refills: 0 | Status: COMPLETED | OUTPATIENT
Start: 2023-07-20

## 2023-07-25 NOTE — HISTORY OF PRESENT ILLNESS
[N/A] : N/A [Denies] : Denies [No] : No [Yes] : Yes [Declined] : Declined [Informed consent documented in EHR.] : Informed consent documented in EHR. [Right upper extremity] : Right upper extremity [22g] : 22g [Start Time: ___] : Medication Start Time: [unfilled] [End Time: ___] : Medication End Time: [unfilled] [IV discontinued. Intact. No signs or symptoms of IV complications noted. Time: ___] : IV discontinued. Intact. No signs or symptoms of IV complications noted. Time: [unfilled] [Patient  instructed to seek medical attention with signs and symptoms of adverse effects] : Patient  instructed to seek medical attention with signs and symptoms of adverse effects [Patient left unit in no acute distress] : Patient left unit in no acute distress [Medications administered as ordered and tolerated well.] : Medications administered as ordered and tolerated well. [Blood drawn at time of visit] : Blood drawn at time of visit [de-identified] : [id number]

## 2023-07-26 LAB
ALBUMIN SERPL ELPH-MCNC: 4.6 G/DL
ALP BLD-CCNC: 89 U/L
ALT SERPL-CCNC: 35 U/L
ANION GAP SERPL CALC-SCNC: 14 MMOL/L
AST SERPL-CCNC: 25 U/L
BILIRUB SERPL-MCNC: 0.5 MG/DL
BUN SERPL-MCNC: 11 MG/DL
CALCIUM SERPL-MCNC: 9.6 MG/DL
CHLORIDE SERPL-SCNC: 101 MMOL/L
CO2 SERPL-SCNC: 24 MMOL/L
CREAT SERPL-MCNC: 1.04 MG/DL
CRP SERPL-MCNC: 4 MG/L
EGFR: 87 ML/MIN/1.73M2
GLUCOSE SERPL-MCNC: 104 MG/DL
POTASSIUM SERPL-SCNC: 5 MMOL/L
PROT SERPL-MCNC: 7.4 G/DL
SODIUM SERPL-SCNC: 139 MMOL/L

## 2023-08-02 ENCOUNTER — APPOINTMENT (OUTPATIENT)
Dept: GASTROENTEROLOGY | Facility: CLINIC | Age: 48
End: 2023-08-02
Payer: COMMERCIAL

## 2023-08-02 VITALS
OXYGEN SATURATION: 98 % | DIASTOLIC BLOOD PRESSURE: 82 MMHG | SYSTOLIC BLOOD PRESSURE: 118 MMHG | HEIGHT: 66 IN | WEIGHT: 172 LBS | BODY MASS INDEX: 27.64 KG/M2 | TEMPERATURE: 97.6 F | HEART RATE: 68 BPM | RESPIRATION RATE: 15 BRPM

## 2023-08-02 PROCEDURE — 99214 OFFICE O/P EST MOD 30 MIN: CPT

## 2023-08-03 NOTE — HISTORY OF PRESENT ILLNESS
[FreeTextEntry1] : This is a 48 year old male with ulcerative colitis diagnosed in 2003, currently taking VDZ every 8 weeks since 12/2020 who presented to the clinic for a 6 month follow up. Patient describes having intermittent episodes of constipation over the last two months. No obvious aggravating or alleviating symptoms. States that when he is able to have a bowel movement, the stool is formed and hard, but non-bloody and non-painful. He feels incomplete emptying. He describes minimal water intake throughout the day, about two cups, and states he has a poor diet secondary to his job as a . He attempts to take over the counter vitamins including magnesium and coq10. He will occasionally experience episodes of loose diarrhea. No abdominal pain, distention, nausea, vomiting, fever, or extraintestinal manifestations. Does not seem to have had small bowel imaging in the past.

## 2023-08-03 NOTE — REASON FOR VISIT
[Follow-up] : a follow-up of an existing diagnosis [FreeTextEntry1] : Follow Up of Pancolitis UC on Entyvio [id number] here for follow up

## 2023-08-03 NOTE — PHYSICAL EXAM
[Hearing Threshold Finger Rub Not [place]] : hearing was normal [Normal Appearance] : the appearance of the neck was normal [No Respiratory Distress] : no respiratory distress [No Acc Muscle Use] : no accessory muscle use [Respiration, Rhythm And Depth] : normal respiratory rhythm and effort [Abdomen Tenderness] : non-tender [Abdomen Soft] : soft [Normal Color / Pigmentation] : normal skin color and pigmentation [Normal Affect] : the affect was normal [Recent Memory Normal] : recent memory was not impaired [Normal Mood] : the mood was normal [Remote Memory Normal] : remote memory was not impaired [Normal] : normal bowel sounds, non-tender, no masses, soft, no no hepato-splenomegaly [de-identified] : normal rate [de-identified] : Doughy masses felt around right abdomen and left lower quadrant.  [de-identified] : +tattoo

## 2023-08-03 NOTE — ASSESSMENT
[FreeTextEntry1] : This is a 48 year old male with ulcerative colitis diagnosed in 2003, currently taking VDZ every 8 weeks since 12/2020 who presented to the clinic for a 6 month follow up. Most recent egd and colonoscopy was 11/2022. EGD with small size hiatal hernia. Colonoscopy with normal appearing colon and some localized increased vascular pattern/erythema in the terminal ileum.   #Pan-Ulcerative Colitis - Continue VDZ every 8 weeks - Will evaluate quant gold for TB evaluation as patient on VDZ - Discussed possability of adjusting VDZ from infusion therapy to home injection, however still not approved in [place]. Will further discuss at follow up visit if medication has been approved.  - Does not seem that patient has had small bowel imaging. Will order CTE to rule out small bowel disease, especially in the setting of constipation.  - Will plan for surveillance colonoscopy 2 years after previous (late 2024)  #Constipation with alternating diarrhea - Likely related to patient's dietary patterns and decreased water intake during the day - Have encouraged patient to begin over the counter fiber supplementation as well as increase water intake during the day - Encouraged follow up with our dietician for further discussion of dietary modification - CTE as above - Continue magnesium supplement intake.   #GERD/Hiatal Hernia - Continue daily PPI as this has improved patient's symptoms - Recommended against use of peppermint oil  Follow up in 6 months  César Cramer DO

## 2023-08-07 LAB
M TB IFN-G BLD-IMP: NEGATIVE
QUANTIFERON TB PLUS MITOGEN MINUS NIL: 8.77 IU/ML
QUANTIFERON TB PLUS NIL: 0.01 IU/ML
QUANTIFERON TB PLUS TB1 MINUS NIL: 0 IU/ML
QUANTIFERON TB PLUS TB2 MINUS NIL: 0 IU/ML

## 2023-09-19 ENCOUNTER — APPOINTMENT (OUTPATIENT)
Dept: RHEUMATOLOGY | Facility: CLINIC | Age: 48
End: 2023-09-19
Payer: COMMERCIAL

## 2023-09-19 VITALS
OXYGEN SATURATION: 99 % | HEART RATE: 74 BPM | RESPIRATION RATE: 16 BRPM | TEMPERATURE: 98 F | DIASTOLIC BLOOD PRESSURE: 74 MMHG | SYSTOLIC BLOOD PRESSURE: 116 MMHG

## 2023-09-19 VITALS
OXYGEN SATURATION: 98 % | SYSTOLIC BLOOD PRESSURE: 109 MMHG | RESPIRATION RATE: 16 BRPM | TEMPERATURE: 98 F | HEART RATE: 70 BPM | DIASTOLIC BLOOD PRESSURE: 69 MMHG

## 2023-09-19 PROCEDURE — 96365 THER/PROPH/DIAG IV INF INIT: CPT

## 2023-09-19 PROCEDURE — 36415 COLL VENOUS BLD VENIPUNCTURE: CPT

## 2023-09-19 RX ORDER — VEDOLIZUMAB 300 MG/5ML
300 INJECTION, POWDER, LYOPHILIZED, FOR SOLUTION INTRAVENOUS
Qty: 0 | Refills: 0 | Status: COMPLETED | OUTPATIENT
Start: 2023-09-14

## 2023-09-20 LAB
ALBUMIN SERPL ELPH-MCNC: 5.1 G/DL
ALP BLD-CCNC: 97 U/L
ALT SERPL-CCNC: 32 U/L
ANION GAP SERPL CALC-SCNC: 15 MMOL/L
AST SERPL-CCNC: 24 U/L
BASOPHILS # BLD AUTO: 0.01 K/UL
BASOPHILS NFR BLD AUTO: 0.1 %
BILIRUB SERPL-MCNC: 0.7 MG/DL
BUN SERPL-MCNC: 12 MG/DL
CALCIUM SERPL-MCNC: 10 MG/DL
CHLORIDE SERPL-SCNC: 100 MMOL/L
CO2 SERPL-SCNC: 26 MMOL/L
CREAT SERPL-MCNC: 1.03 MG/DL
CRP SERPL-MCNC: 7 MG/L
EGFR: 88 ML/MIN/1.73M2
EOSINOPHIL # BLD AUTO: 0.12 K/UL
EOSINOPHIL NFR BLD AUTO: 1.8 %
GLUCOSE SERPL-MCNC: 86 MG/DL
HCT VFR BLD CALC: 51.9 %
HGB BLD-MCNC: 16.8 G/DL
IMM GRANULOCYTES NFR BLD AUTO: 0.3 %
LYMPHOCYTES # BLD AUTO: 1.81 K/UL
LYMPHOCYTES NFR BLD AUTO: 27 %
MAN DIFF?: NORMAL
MCHC RBC-ENTMCNC: 29.9 PG
MCHC RBC-ENTMCNC: 32.4 GM/DL
MCV RBC AUTO: 92.3 FL
MONOCYTES # BLD AUTO: 0.56 K/UL
MONOCYTES NFR BLD AUTO: 8.3 %
NEUTROPHILS # BLD AUTO: 4.19 K/UL
NEUTROPHILS NFR BLD AUTO: 62.5 %
PLATELET # BLD AUTO: 352 K/UL
POTASSIUM SERPL-SCNC: 4.9 MMOL/L
PROT SERPL-MCNC: 8.1 G/DL
RBC # BLD: 5.62 M/UL
RBC # FLD: 15.1 %
SODIUM SERPL-SCNC: 140 MMOL/L
WBC # FLD AUTO: 6.71 K/UL

## 2023-09-25 ENCOUNTER — RX RENEWAL (OUTPATIENT)
Age: 48
End: 2023-09-25

## 2023-10-03 ENCOUNTER — APPOINTMENT (OUTPATIENT)
Dept: CARDIOLOGY | Facility: CLINIC | Age: 48
End: 2023-10-03
Payer: COMMERCIAL

## 2023-10-03 PROCEDURE — 93306 TTE W/DOPPLER COMPLETE: CPT

## 2023-11-14 ENCOUNTER — APPOINTMENT (OUTPATIENT)
Dept: RHEUMATOLOGY | Facility: CLINIC | Age: 48
End: 2023-11-14
Payer: COMMERCIAL

## 2023-11-14 VITALS
HEART RATE: 76 BPM | SYSTOLIC BLOOD PRESSURE: 105 MMHG | OXYGEN SATURATION: 99 % | TEMPERATURE: 97.9 F | DIASTOLIC BLOOD PRESSURE: 74 MMHG | RESPIRATION RATE: 16 BRPM

## 2023-11-14 VITALS
RESPIRATION RATE: 16 BRPM | DIASTOLIC BLOOD PRESSURE: 70 MMHG | SYSTOLIC BLOOD PRESSURE: 100 MMHG | HEART RATE: 70 BPM | OXYGEN SATURATION: 100 % | TEMPERATURE: 98 F

## 2023-11-14 PROCEDURE — 96413 CHEMO IV INFUSION 1 HR: CPT

## 2023-11-14 PROCEDURE — 36415 COLL VENOUS BLD VENIPUNCTURE: CPT

## 2023-11-14 RX ORDER — VEDOLIZUMAB 300 MG/5ML
300 INJECTION, POWDER, LYOPHILIZED, FOR SOLUTION INTRAVENOUS
Qty: 0 | Refills: 0 | Status: COMPLETED | OUTPATIENT
Start: 2023-11-09

## 2023-11-15 LAB
ALBUMIN SERPL ELPH-MCNC: 4.9 G/DL
ALP BLD-CCNC: 89 U/L
ALT SERPL-CCNC: 40 U/L
ANION GAP SERPL CALC-SCNC: 10 MMOL/L
AST SERPL-CCNC: 28 U/L
BASOPHILS # BLD AUTO: 0.02 K/UL
BASOPHILS NFR BLD AUTO: 0.3 %
BILIRUB SERPL-MCNC: 1.1 MG/DL
BUN SERPL-MCNC: 12 MG/DL
CALCIUM SERPL-MCNC: 9.9 MG/DL
CHLORIDE SERPL-SCNC: 101 MMOL/L
CO2 SERPL-SCNC: 29 MMOL/L
CREAT SERPL-MCNC: 1 MG/DL
CRP SERPL-MCNC: 5 MG/L
EGFR: 92 ML/MIN/1.73M2
EOSINOPHIL # BLD AUTO: 0.21 K/UL
EOSINOPHIL NFR BLD AUTO: 3.4 %
GLUCOSE SERPL-MCNC: 107 MG/DL
HCT VFR BLD CALC: 48.5 %
HGB BLD-MCNC: 16.1 G/DL
IMM GRANULOCYTES NFR BLD AUTO: 0.2 %
LYMPHOCYTES # BLD AUTO: 1.51 K/UL
LYMPHOCYTES NFR BLD AUTO: 24.4 %
MAN DIFF?: NORMAL
MCHC RBC-ENTMCNC: 30.2 PG
MCHC RBC-ENTMCNC: 33.2 GM/DL
MCV RBC AUTO: 91 FL
MONOCYTES # BLD AUTO: 0.52 K/UL
MONOCYTES NFR BLD AUTO: 8.4 %
NEUTROPHILS # BLD AUTO: 3.91 K/UL
NEUTROPHILS NFR BLD AUTO: 63.3 %
PLATELET # BLD AUTO: 341 K/UL
POTASSIUM SERPL-SCNC: 5.2 MMOL/L
PROT SERPL-MCNC: 7.8 G/DL
RBC # BLD: 5.33 M/UL
RBC # FLD: 13 %
SODIUM SERPL-SCNC: 140 MMOL/L
WBC # FLD AUTO: 6.18 K/UL

## 2024-01-09 ENCOUNTER — APPOINTMENT (OUTPATIENT)
Dept: RHEUMATOLOGY | Facility: CLINIC | Age: 49
End: 2024-01-09
Payer: COMMERCIAL

## 2024-01-09 VITALS
RESPIRATION RATE: 16 BRPM | SYSTOLIC BLOOD PRESSURE: 103 MMHG | OXYGEN SATURATION: 100 % | DIASTOLIC BLOOD PRESSURE: 71 MMHG | HEART RATE: 69 BPM | TEMPERATURE: 98 F

## 2024-01-09 VITALS
HEART RATE: 74 BPM | OXYGEN SATURATION: 100 % | DIASTOLIC BLOOD PRESSURE: 74 MMHG | SYSTOLIC BLOOD PRESSURE: 105 MMHG | RESPIRATION RATE: 16 BRPM | TEMPERATURE: 97.9 F

## 2024-01-09 PROCEDURE — 96413 CHEMO IV INFUSION 1 HR: CPT

## 2024-01-09 PROCEDURE — 36415 COLL VENOUS BLD VENIPUNCTURE: CPT

## 2024-01-09 RX ORDER — VEDOLIZUMAB 300 MG/5ML
300 INJECTION, POWDER, LYOPHILIZED, FOR SOLUTION INTRAVENOUS
Qty: 0 | Refills: 0 | Status: COMPLETED | OUTPATIENT
Start: 2024-01-04

## 2024-01-10 LAB
ALBUMIN SERPL ELPH-MCNC: 4.9 G/DL
ALP BLD-CCNC: 89 U/L
ALT SERPL-CCNC: 30 U/L
ANION GAP SERPL CALC-SCNC: 10 MMOL/L
AST SERPL-CCNC: 20 U/L
BASOPHILS # BLD AUTO: 0.01 K/UL
BASOPHILS NFR BLD AUTO: 0.2 %
BILIRUB SERPL-MCNC: 0.8 MG/DL
BUN SERPL-MCNC: 13 MG/DL
CALCIUM SERPL-MCNC: 10 MG/DL
CHLORIDE SERPL-SCNC: 99 MMOL/L
CO2 SERPL-SCNC: 29 MMOL/L
CREAT SERPL-MCNC: 1.02 MG/DL
CRP SERPL-MCNC: 5 MG/L
EGFR: 90 ML/MIN/1.73M2
EOSINOPHIL # BLD AUTO: 0.18 K/UL
EOSINOPHIL NFR BLD AUTO: 3.3 %
GLUCOSE SERPL-MCNC: 104 MG/DL
HCT VFR BLD CALC: 49.1 %
HGB BLD-MCNC: 15.9 G/DL
IMM GRANULOCYTES NFR BLD AUTO: 0.2 %
LYMPHOCYTES # BLD AUTO: 1.49 K/UL
LYMPHOCYTES NFR BLD AUTO: 27.1 %
MAN DIFF?: NORMAL
MCHC RBC-ENTMCNC: 29.8 PG
MCHC RBC-ENTMCNC: 32.4 GM/DL
MCV RBC AUTO: 91.9 FL
MONOCYTES # BLD AUTO: 0.43 K/UL
MONOCYTES NFR BLD AUTO: 7.8 %
NEUTROPHILS # BLD AUTO: 3.38 K/UL
NEUTROPHILS NFR BLD AUTO: 61.4 %
PLATELET # BLD AUTO: 373 K/UL
POTASSIUM SERPL-SCNC: 4.8 MMOL/L
PROT SERPL-MCNC: 7.7 G/DL
RBC # BLD: 5.34 M/UL
RBC # FLD: 12.9 %
SODIUM SERPL-SCNC: 139 MMOL/L
WBC # FLD AUTO: 5.5 K/UL

## 2024-02-16 ENCOUNTER — LABORATORY RESULT (OUTPATIENT)
Age: 49
End: 2024-02-16

## 2024-02-16 ENCOUNTER — APPOINTMENT (OUTPATIENT)
Dept: CARDIOLOGY | Facility: CLINIC | Age: 49
End: 2024-02-16
Payer: COMMERCIAL

## 2024-02-16 VITALS
HEART RATE: 81 BPM | RESPIRATION RATE: 16 BRPM | DIASTOLIC BLOOD PRESSURE: 79 MMHG | TEMPERATURE: 98.2 F | HEIGHT: 66 IN | SYSTOLIC BLOOD PRESSURE: 126 MMHG | WEIGHT: 175 LBS | BODY MASS INDEX: 28.12 KG/M2 | OXYGEN SATURATION: 96 %

## 2024-02-16 DIAGNOSIS — R01.1 CARDIAC MURMUR, UNSPECIFIED: ICD-10-CM

## 2024-02-16 DIAGNOSIS — R06.09 OTHER FORMS OF DYSPNEA: ICD-10-CM

## 2024-02-16 DIAGNOSIS — R73.03 PREDIABETES.: ICD-10-CM

## 2024-02-16 DIAGNOSIS — I10 ESSENTIAL (PRIMARY) HYPERTENSION: ICD-10-CM

## 2024-02-16 PROCEDURE — 93015 CV STRESS TEST SUPVJ I&R: CPT

## 2024-02-16 PROCEDURE — 99213 OFFICE O/P EST LOW 20 MIN: CPT | Mod: 25

## 2024-02-16 NOTE — DISCUSSION/SUMMARY
[FreeTextEntry1] : This is a 49-year-old male with past medical history significant of hypertension, ulcerative colitis, dyspepsia, who comes in for cardiac follow-up evaluation. He denies shortness of breath, palpitations, dizziness, PND orthopnea or syncope. He has no history of rheumatic fever.  He does not drink excessive caffeine or alcohol. Cardiac risk factors include hypertension, inflammatory bowel disease (is a risk enhancing feature), and positive family history of heart disease (his grandfather had a significant myocardial infarction age 45. The patient is on infusion therapy for his ulcerative colitis. The patient receives infusions every 8 weeks with ENTYVIO at [facility].  His blood pressure has been stable on amlodipine 5 mg daily. Patient had a normal exercise stress test February 16, 2024. Lipid panel done March 3, 2023 demonstrated cholesterol 177, HDL 42, triglycerides of 100, LDL direct 115, non-HDL cholesterol 135 mg/dL. The patient had normal exercise stress test September 27, 2022. He will have new blood work done today for lipid panel and electrolytes. Electrocardiogram done March 16, 2022 demonstrated normal sinus rhythm rate 65 bpm is otherwise unremarkable. Echo Doppler examination done at [facility] August 19, 2020 demonstrated physiologic mitral and tricuspid valve regurgitation with normal left ventricular ejection fraction of 61%.  He is currently hemodynamically stable and asymptomatic from a cardiac standpoint. Blood work done September 29, 2020 demonstrated cholesterol 135, HDL 38, triglycerides 190, LDL cholesterol 71 mg/dL. The patient reports that he had a normal stress echocardiogram the week of March 7, 2022. The patient understands that aerobic exercises must be increased to 40 minutes 4 times per week. A detailed discussion of lifestyle modification was done today. The patient has a good understanding of the diagnosis, and treatment plan. Lifestyle modification was also outlined.

## 2024-02-16 NOTE — REASON FOR VISIT
[CV Risk Factors and Non-Cardiac Disease] : CV risk factors and non-cardiac disease [Hypertension] : hypertension [FreeTextEntry1] : This is a 47-year-old male with past medical history significant of hypertension, ulcerative colitis, dyspepsia, who comes in for follow-up cardiac evaluation. Patient states he is generally feeling well today however, he reports occasional dyspnea on exertion. He denies chest pain, lightheadedness, palpitations, dizziness, PND, orthopnea or syncope. He has no history of rheumatic fever.  He does not drink excessive caffeine or alcohol. Cardiac risk factors include hypertension, and positive family history of heart disease (his grandfather had a significant myocardial infarction age 45. His blood pressure is under adequate control on Norvasc 5 mg daily. The patient receives infusions every 8 weeks with ENTYVIO The patient reports that he had a normal stress echocardiogram the week of March 7, 2022.

## 2024-02-19 RX ORDER — FAMOTIDINE 20 MG/1
20 TABLET, FILM COATED ORAL
Qty: 90 | Refills: 0 | Status: DISCONTINUED | COMMUNITY
Start: 2023-03-06 | End: 2024-02-19

## 2024-03-05 ENCOUNTER — RX RENEWAL (OUTPATIENT)
Age: 49
End: 2024-03-05

## 2024-03-05 ENCOUNTER — APPOINTMENT (OUTPATIENT)
Dept: RHEUMATOLOGY | Facility: CLINIC | Age: 49
End: 2024-03-05
Payer: COMMERCIAL

## 2024-03-05 VITALS
OXYGEN SATURATION: 100 % | TEMPERATURE: 98.3 F | RESPIRATION RATE: 16 BRPM | SYSTOLIC BLOOD PRESSURE: 128 MMHG | HEART RATE: 97 BPM | DIASTOLIC BLOOD PRESSURE: 77 MMHG

## 2024-03-05 VITALS
HEART RATE: 74 BPM | TEMPERATURE: 98.3 F | RESPIRATION RATE: 16 BRPM | DIASTOLIC BLOOD PRESSURE: 80 MMHG | SYSTOLIC BLOOD PRESSURE: 119 MMHG | OXYGEN SATURATION: 98 %

## 2024-03-05 PROCEDURE — 96413 CHEMO IV INFUSION 1 HR: CPT

## 2024-03-05 PROCEDURE — 36415 COLL VENOUS BLD VENIPUNCTURE: CPT

## 2024-03-05 RX ORDER — VEDOLIZUMAB 300 MG/5ML
300 INJECTION, POWDER, LYOPHILIZED, FOR SOLUTION INTRAVENOUS
Qty: 0 | Refills: 0 | Status: COMPLETED | OUTPATIENT
Start: 2024-02-29

## 2024-03-05 NOTE — HISTORY OF PRESENT ILLNESS
[N/A] : N/A [Denies] : Denies [No] : No [Yes] : Yes [Declined] : Declined [Informed consent documented in EHR.] : Informed consent documented in EHR. [Right upper extremity] : Right upper extremity [22g] : 22g [Start Time: ___] : Medication Start Time: [unfilled] [End Time: ___] : Medication End Time: [unfilled] [Medication Name: ___] : Medication Name: [unfilled] [Total Amount Administered: ___] : Total Amount Administered: [unfilled] [IV discontinued. Intact. No signs or symptoms of IV complications noted. Time: ___] : IV discontinued. Intact. No signs or symptoms of IV complications noted. Time: [unfilled] [Patient  instructed to seek medical attention with signs and symptoms of adverse effects] : Patient  instructed to seek medical attention with signs and symptoms of adverse effects [Patient left unit in no acute distress] : Patient left unit in no acute distress [Medications administered as ordered and tolerated well.] : Medications administered as ordered and tolerated well. [Blood drawn at time of visit] : Blood drawn at time of visit [de-identified] : AC [de-identified] : [id number]

## 2024-03-06 LAB
ALBUMIN SERPL ELPH-MCNC: 4.6 G/DL
ALP BLD-CCNC: 81 U/L
ALT SERPL-CCNC: 41 U/L
ANION GAP SERPL CALC-SCNC: 13 MMOL/L
AST SERPL-CCNC: 23 U/L
BASOPHILS # BLD AUTO: 0.02 K/UL
BASOPHILS NFR BLD AUTO: 0.3 %
BILIRUB SERPL-MCNC: 0.7 MG/DL
BUN SERPL-MCNC: 11 MG/DL
CALCIUM SERPL-MCNC: 9.6 MG/DL
CHLORIDE SERPL-SCNC: 101 MMOL/L
CO2 SERPL-SCNC: 25 MMOL/L
CREAT SERPL-MCNC: 1.01 MG/DL
CRP SERPL-MCNC: 4 MG/L
EGFR: 90 ML/MIN/1.73M2
EOSINOPHIL # BLD AUTO: 0.17 K/UL
EOSINOPHIL NFR BLD AUTO: 2.7 %
GLUCOSE SERPL-MCNC: 105 MG/DL
HCT VFR BLD CALC: 46 %
HGB BLD-MCNC: 15.4 G/DL
IMM GRANULOCYTES NFR BLD AUTO: 0.2 %
LYMPHOCYTES # BLD AUTO: 1.58 K/UL
LYMPHOCYTES NFR BLD AUTO: 24.7 %
MAN DIFF?: NORMAL
MCHC RBC-ENTMCNC: 30 PG
MCHC RBC-ENTMCNC: 33.5 GM/DL
MCV RBC AUTO: 89.7 FL
MONOCYTES # BLD AUTO: 0.47 K/UL
MONOCYTES NFR BLD AUTO: 7.4 %
NEUTROPHILS # BLD AUTO: 4.14 K/UL
NEUTROPHILS NFR BLD AUTO: 64.7 %
PLATELET # BLD AUTO: 326 K/UL
POTASSIUM SERPL-SCNC: 4.6 MMOL/L
PROT SERPL-MCNC: 7.4 G/DL
RBC # BLD: 5.13 M/UL
RBC # FLD: 13.2 %
SODIUM SERPL-SCNC: 139 MMOL/L
WBC # FLD AUTO: 6.39 K/UL

## 2024-03-06 RX ORDER — AMLODIPINE BESYLATE 5 MG/1
5 TABLET ORAL
Qty: 90 | Refills: 1 | Status: ACTIVE | COMMUNITY
Start: 2024-03-05 | End: 1900-01-01

## 2024-05-01 ENCOUNTER — APPOINTMENT (OUTPATIENT)
Dept: RHEUMATOLOGY | Facility: CLINIC | Age: 49
End: 2024-05-01
Payer: COMMERCIAL

## 2024-05-01 VITALS
SYSTOLIC BLOOD PRESSURE: 123 MMHG | RESPIRATION RATE: 16 BRPM | DIASTOLIC BLOOD PRESSURE: 79 MMHG | OXYGEN SATURATION: 99 % | HEART RATE: 80 BPM | TEMPERATURE: 98.1 F

## 2024-05-01 VITALS
TEMPERATURE: 98.2 F | RESPIRATION RATE: 16 BRPM | HEART RATE: 74 BPM | SYSTOLIC BLOOD PRESSURE: 121 MMHG | DIASTOLIC BLOOD PRESSURE: 70 MMHG | OXYGEN SATURATION: 100 %

## 2024-05-01 PROCEDURE — 96413 CHEMO IV INFUSION 1 HR: CPT

## 2024-05-01 PROCEDURE — 36415 COLL VENOUS BLD VENIPUNCTURE: CPT

## 2024-05-01 RX ORDER — VEDOLIZUMAB 300 MG/5ML
300 INJECTION, POWDER, LYOPHILIZED, FOR SOLUTION INTRAVENOUS
Qty: 0 | Refills: 0 | Status: COMPLETED | OUTPATIENT
Start: 2024-04-24

## 2024-05-01 NOTE — HISTORY OF PRESENT ILLNESS
[N/A] : N/A [Denies] : Denies [No] : No [Yes] : Yes [Declined] : Declined [Informed consent documented in EHR.] : Informed consent documented in EHR. [Right upper extremity] : Right upper extremity [22g] : 22g [Start Time: ___] : Medication Start Time: [unfilled] [End Time: ___] : Medication End Time: [unfilled] [Medication Name: ___] : Medication Name: [unfilled] [Total Amount Administered: ___] : Total Amount Administered: [unfilled] [IV discontinued. Intact. No signs or symptoms of IV complications noted. Time: ___] : IV discontinued. Intact. No signs or symptoms of IV complications noted. Time: [unfilled] [Patient  instructed to seek medical attention with signs and symptoms of adverse effects] : Patient  instructed to seek medical attention with signs and symptoms of adverse effects [Patient left unit in no acute distress] : Patient left unit in no acute distress [Medications administered as ordered and tolerated well.] : Medications administered as ordered and tolerated well. [Blood drawn at time of visit] : Blood drawn at time of visit [de-identified] : [id number]

## 2024-05-04 LAB
ALBUMIN SERPL ELPH-MCNC: 4.8 G/DL
ALP BLD-CCNC: 88 U/L
ALT SERPL-CCNC: 37 U/L
ANION GAP SERPL CALC-SCNC: 13 MMOL/L
AST SERPL-CCNC: 24 U/L
BASOPHILS # BLD AUTO: 0.02 K/UL
BASOPHILS NFR BLD AUTO: 0.3 %
BILIRUB SERPL-MCNC: 0.7 MG/DL
BUN SERPL-MCNC: 13 MG/DL
CALCIUM SERPL-MCNC: 9.6 MG/DL
CHLORIDE SERPL-SCNC: 101 MMOL/L
CO2 SERPL-SCNC: 26 MMOL/L
CREAT SERPL-MCNC: 1.03 MG/DL
CRP SERPL-MCNC: 5 MG/L
EGFR: 88 ML/MIN/1.73M2
EOSINOPHIL # BLD AUTO: 0.21 K/UL
EOSINOPHIL NFR BLD AUTO: 3.2 %
GLUCOSE SERPL-MCNC: 97 MG/DL
HCT VFR BLD CALC: 47.4 %
HGB BLD-MCNC: 15.7 G/DL
IMM GRANULOCYTES NFR BLD AUTO: 0.2 %
LYMPHOCYTES # BLD AUTO: 1.82 K/UL
LYMPHOCYTES NFR BLD AUTO: 27.5 %
MAN DIFF?: NORMAL
MCHC RBC-ENTMCNC: 30 PG
MCHC RBC-ENTMCNC: 33.1 GM/DL
MCV RBC AUTO: 90.5 FL
MONOCYTES # BLD AUTO: 0.5 K/UL
MONOCYTES NFR BLD AUTO: 7.5 %
NEUTROPHILS # BLD AUTO: 4.07 K/UL
NEUTROPHILS NFR BLD AUTO: 61.3 %
PLATELET # BLD AUTO: 330 K/UL
POTASSIUM SERPL-SCNC: 5.1 MMOL/L
PROT SERPL-MCNC: 7.8 G/DL
RBC # BLD: 5.24 M/UL
RBC # FLD: 13.2 %
SODIUM SERPL-SCNC: 140 MMOL/L
WBC # FLD AUTO: 6.63 K/UL

## 2024-06-25 ENCOUNTER — APPOINTMENT (OUTPATIENT)
Dept: RHEUMATOLOGY | Facility: CLINIC | Age: 49
End: 2024-06-25
Payer: COMMERCIAL

## 2024-06-25 VITALS
TEMPERATURE: 98.2 F | DIASTOLIC BLOOD PRESSURE: 76 MMHG | SYSTOLIC BLOOD PRESSURE: 120 MMHG | RESPIRATION RATE: 16 BRPM | OXYGEN SATURATION: 100 % | HEART RATE: 68 BPM

## 2024-06-25 VITALS
OXYGEN SATURATION: 100 % | RESPIRATION RATE: 16 BRPM | SYSTOLIC BLOOD PRESSURE: 124 MMHG | TEMPERATURE: 98 F | DIASTOLIC BLOOD PRESSURE: 76 MMHG | HEART RATE: 70 BPM

## 2024-06-25 DIAGNOSIS — K51.90 ULCERATIVE COLITIS, UNSPECIFIED, W/OUT COMPLICATIONS: ICD-10-CM

## 2024-06-25 PROCEDURE — 96413 CHEMO IV INFUSION 1 HR: CPT

## 2024-06-25 PROCEDURE — 36415 COLL VENOUS BLD VENIPUNCTURE: CPT

## 2024-06-25 RX ORDER — VEDOLIZUMAB 300 MG/5ML
300 INJECTION, POWDER, LYOPHILIZED, FOR SOLUTION INTRAVENOUS
Qty: 0 | Refills: 0 | Status: COMPLETED | OUTPATIENT
Start: 2024-06-19

## 2024-06-26 LAB
ALBUMIN SERPL ELPH-MCNC: 4.7 G/DL
ALP BLD-CCNC: 77 U/L
ALT SERPL-CCNC: 52 U/L
ANION GAP SERPL CALC-SCNC: 12 MMOL/L
AST SERPL-CCNC: 32 U/L
BASOPHILS # BLD AUTO: 0.02 K/UL
BASOPHILS NFR BLD AUTO: 0.4 %
BILIRUB SERPL-MCNC: 0.7 MG/DL
BUN SERPL-MCNC: 12 MG/DL
CALCIUM SERPL-MCNC: 9.5 MG/DL
CHLORIDE SERPL-SCNC: 101 MMOL/L
CO2 SERPL-SCNC: 26 MMOL/L
CREAT SERPL-MCNC: 1.08 MG/DL
CRP SERPL-MCNC: 5 MG/L
EGFR: 83 ML/MIN/1.73M2
EOSINOPHIL # BLD AUTO: 0.18 K/UL
EOSINOPHIL NFR BLD AUTO: 3.3 %
GLUCOSE SERPL-MCNC: 108 MG/DL
HCT VFR BLD CALC: 51 %
HGB BLD-MCNC: 15.8 G/DL
IMM GRANULOCYTES NFR BLD AUTO: 0.2 %
LYMPHOCYTES # BLD AUTO: 1.44 K/UL
LYMPHOCYTES NFR BLD AUTO: 26 %
MAN DIFF?: NORMAL
MCHC RBC-ENTMCNC: 29.9 PG
MCHC RBC-ENTMCNC: 31 GM/DL
MCV RBC AUTO: 96.4 FL
MONOCYTES # BLD AUTO: 0.48 K/UL
MONOCYTES NFR BLD AUTO: 8.7 %
NEUTROPHILS # BLD AUTO: 3.4 K/UL
NEUTROPHILS NFR BLD AUTO: 61.4 %
PLATELET # BLD AUTO: 352 K/UL
POTASSIUM SERPL-SCNC: 5 MMOL/L
PROT SERPL-MCNC: 7.5 G/DL
RBC # BLD: 5.29 M/UL
RBC # FLD: 13.8 %
SODIUM SERPL-SCNC: 140 MMOL/L
WBC # FLD AUTO: 5.53 K/UL

## 2024-07-30 ENCOUNTER — NON-APPOINTMENT (OUTPATIENT)
Age: 49
End: 2024-07-30

## 2024-07-31 ENCOUNTER — APPOINTMENT (OUTPATIENT)
Dept: GASTROENTEROLOGY | Facility: CLINIC | Age: 49
End: 2024-07-31
Payer: COMMERCIAL

## 2024-07-31 ENCOUNTER — NON-APPOINTMENT (OUTPATIENT)
Age: 49
End: 2024-07-31

## 2024-07-31 ENCOUNTER — OUTPATIENT (OUTPATIENT)
Dept: OUTPATIENT SERVICES | Facility: HOSPITAL | Age: 49
LOS: 1 days | End: 2024-07-31

## 2024-07-31 VITALS
DIASTOLIC BLOOD PRESSURE: 80 MMHG | TEMPERATURE: 96.8 F | HEIGHT: 66 IN | SYSTOLIC BLOOD PRESSURE: 110 MMHG | RESPIRATION RATE: 16 BRPM | HEART RATE: 78 BPM | BODY MASS INDEX: 28.12 KG/M2 | WEIGHT: 175 LBS | OXYGEN SATURATION: 98 %

## 2024-07-31 DIAGNOSIS — R10.9 UNSPECIFIED ABDOMINAL PAIN: ICD-10-CM

## 2024-07-31 PROCEDURE — 99214 OFFICE O/P EST MOD 30 MIN: CPT

## 2024-07-31 PROCEDURE — 74019 RADEX ABDOMEN 2 VIEWS: CPT | Mod: 26

## 2024-07-31 PROCEDURE — 74019 RADEX ABDOMEN 2 VIEWS: CPT

## 2024-07-31 PROCEDURE — G2211 COMPLEX E/M VISIT ADD ON: CPT | Mod: NC

## 2024-07-31 RX ORDER — VEDOLIZUMAB 108 MG/.68ML
108 INJECTION, SOLUTION SUBCUTANEOUS
Qty: 2 | Refills: 3 | Status: ACTIVE | COMMUNITY
Start: 2024-07-31 | End: 1900-01-01

## 2024-08-02 NOTE — ASSESSMENT
[FreeTextEntry1] : This is a 49 year old male with ulcerative colitis (diagnosed in 2003, currently taking VDZ every 8 weeks since 12/2020) who presents today in deep remission. Patient describes continued alternating constipation and diarrhea. Previously recommended to initiate bowel regimen, however has not done so. Tolerates his VDZ q8 week infusions well, but wishes to have an infusion option closer to home, as he commutes about 90 minutes to the infusion center.   #Ulcerative colitis, deep remission - Given remission status, will plan to continue VDZ q8 week infusions for now - Discussed with patient that there is now an at home subq version of VDZ that can be taken every 2 weeks and that this may be a better option for the patient, given his remission status and difficulty commuting to infusion center. Will submit priorauth and discuss with in house pharmacist - Will plan for surveillance colonoscopy this fall - Recent labs from infusion center personally reviewed and interpreted to be within acceptable limits  #Constipation with overflow diarrhea and bloating - Likely the same complaint during previous evaluation - Abdominal xray obtained today identified large fecal load. Discussed with patient to initiate daily bowel regimen with magnesium citrate and miralax. Patient agreeable to continue miralax daily and titrate as needed for 1-2 complete bowel movements per day - Patient feels as if he is not able to use appropriate musculature to initiate and have bowel movements. Will consider obtaining anorectal manometry if patient continues to develop increased fecal load while on daily bowel regimen  #GERD/Hiatal Hernia - Continue daily PPI as this has improved patient's symptoms - Recommended against use of peppermint oil  Follow up post procedure  Césra Cramer DO Gastroenterology Fellow [facility]

## 2024-08-02 NOTE — HISTORY OF PRESENT ILLNESS
[FreeTextEntry1] : This is a 49 year old male with ulcerative colitis (diagnosed in 2003, currently taking VDZ every 8 weeks since 12/2020) who presents today in deep remission.  7/31/2024 Patient describes continued alternating constipation and diarrhea. Feels as if he is unable to have complete bowel movements and has to sit on the toilet for extended periods of time. Does not strain frequently. Previously recommended to initiate bowel regimen, however has not done so. Patient feels as if he is not able to use appropriate musculature to initiate and have bowel movements. States he has occasional abdominal cramps, but denies nausea, vomiting, hematochezia, and melena. Denies new joint pains, fever, rash, or other extraintestinal manifestations. Tolerates his VDZ q8 week infusions well, but wishes to have an infusion option closer to home, as he commutes about 90 minutes to the infusion center.   8/2/2023 Patient describes having intermittent episodes of constipation over the last two months. No obvious aggravating or alleviating symptoms. States that when he is able to have a bowel movement, the stool is formed and hard, but non-bloody and non-painful. He feels incomplete emptying. He describes minimal water intake throughout the day, about two cups, and states he has a poor diet secondary to his job as a . He attempts to take over the counter vitamins including magnesium and coq10. He will occasionally experience episodes of loose diarrhea. No abdominal pain, distention, nausea, vomiting, fever, or extraintestinal manifestations. Does not seem to have had small bowel imaging in the past.

## 2024-08-02 NOTE — ASSESSMENT
[FreeTextEntry1] : This is a 49 year old male with ulcerative colitis (diagnosed in 2003, currently taking VDZ every 8 weeks since 12/2020) who presents today in deep remission. Patient describes continued alternating constipation and diarrhea. Previously recommended to initiate bowel regimen, however has not done so. Tolerates his VDZ q8 week infusions well, but wishes to have an infusion option closer to home, as he commutes about 90 minutes to the infusion center.   #Ulcerative colitis, deep remission - Given remission status, will plan to continue VDZ q8 week infusions for now - Discussed with patient that there is now an at home subq version of VDZ that can be taken every 2 weeks and that this may be a better option for the patient, given his remission status and difficulty commuting to infusion center. Will submit priorauth and discuss with in house pharmacist - Will plan for surveillance colonoscopy this fall - Recent labs from infusion center personally reviewed and interpreted to be within acceptable limits  #Constipation with overflow diarrhea and bloating - Likely the same complaint during previous evaluation - Abdominal xray obtained today identified large fecal load. Discussed with patient to initiate daily bowel regimen with magnesium citrate and miralax. Patient agreeable to continue miralax daily and titrate as needed for 1-2 complete bowel movements per day - Patient feels as if he is not able to use appropriate musculature to initiate and have bowel movements. Will consider obtaining anorectal manometry if patient continues to develop increased fecal load while on daily bowel regimen  #GERD/Hiatal Hernia - Continue daily PPI as this has improved patient's symptoms - Recommended against use of peppermint oil  Follow up post procedure  César Cramer DO Gastroenterology Fellow [facility]

## 2024-08-21 ENCOUNTER — APPOINTMENT (OUTPATIENT)
Dept: RHEUMATOLOGY | Facility: CLINIC | Age: 49
End: 2024-08-21
Payer: COMMERCIAL

## 2024-08-21 VITALS
DIASTOLIC BLOOD PRESSURE: 81 MMHG | OXYGEN SATURATION: 96 % | SYSTOLIC BLOOD PRESSURE: 123 MMHG | TEMPERATURE: 97.9 F | RESPIRATION RATE: 15 BRPM | HEART RATE: 66 BPM

## 2024-08-21 VITALS
SYSTOLIC BLOOD PRESSURE: 134 MMHG | DIASTOLIC BLOOD PRESSURE: 90 MMHG | OXYGEN SATURATION: 98 % | TEMPERATURE: 98 F | HEART RATE: 70 BPM | RESPIRATION RATE: 15 BRPM

## 2024-08-21 DIAGNOSIS — K51.90 ULCERATIVE COLITIS, UNSPECIFIED, W/OUT COMPLICATIONS: ICD-10-CM

## 2024-08-21 PROCEDURE — 36415 COLL VENOUS BLD VENIPUNCTURE: CPT

## 2024-08-21 PROCEDURE — 96413 CHEMO IV INFUSION 1 HR: CPT

## 2024-08-21 RX ORDER — VEDOLIZUMAB 300 MG/5ML
300 INJECTION, POWDER, LYOPHILIZED, FOR SOLUTION INTRAVENOUS
Qty: 0 | Refills: 0 | Status: COMPLETED | OUTPATIENT
Start: 2024-08-16

## 2024-08-21 NOTE — HISTORY OF PRESENT ILLNESS
[N/A] : N/A [Denies] : Denies [No] : No [Yes] : Yes [Declined] : Declined [Informed consent documented in EHR.] : Informed consent documented in EHR. [Right upper extremity] : Right upper extremity [20g] : 20g [Start Time: ___] : Medication Start Time: [unfilled] [End Time: ___] : Medication End Time: [unfilled] [IV discontinued. Intact. No signs or symptoms of IV complications noted. Time: ___] : IV discontinued. Intact. No signs or symptoms of IV complications noted. Time: [unfilled] [Patient  instructed to seek medical attention with signs and symptoms of adverse effects] : Patient  instructed to seek medical attention with signs and symptoms of adverse effects [Patient left unit in no acute distress] : Patient left unit in no acute distress [Medications administered as ordered and tolerated well.] : Medications administered as ordered and tolerated well. [Blood drawn at time of visit] : Blood drawn at time of visit [de-identified] : right wrist [de-identified] : 9:25am

## 2024-08-23 ENCOUNTER — NON-APPOINTMENT (OUTPATIENT)
Age: 49
End: 2024-08-23

## 2024-08-23 DIAGNOSIS — R79.89 OTHER SPECIFIED ABNORMAL FINDINGS OF BLOOD CHEMISTRY: ICD-10-CM

## 2024-08-23 LAB
ALBUMIN SERPL ELPH-MCNC: 4.7 G/DL
ALP BLD-CCNC: 86 U/L
ALT SERPL-CCNC: 55 U/L
ANION GAP SERPL CALC-SCNC: 12 MMOL/L
AST SERPL-CCNC: 38 U/L
BASOPHILS # BLD AUTO: 0.01 K/UL
BASOPHILS NFR BLD AUTO: 0.2 %
BILIRUB SERPL-MCNC: 0.6 MG/DL
BUN SERPL-MCNC: 11 MG/DL
CALCIUM SERPL-MCNC: 10 MG/DL
CHLORIDE SERPL-SCNC: 100 MMOL/L
CO2 SERPL-SCNC: 27 MMOL/L
CREAT SERPL-MCNC: 0.95 MG/DL
CRP SERPL-MCNC: 6 MG/L
EGFR: 97 ML/MIN/1.73M2
EOSINOPHIL # BLD AUTO: 0.22 K/UL
EOSINOPHIL NFR BLD AUTO: 3.3 %
GLUCOSE SERPL-MCNC: 105 MG/DL
HCT VFR BLD CALC: 49.8 %
HGB BLD-MCNC: 16.1 G/DL
IMM GRANULOCYTES NFR BLD AUTO: 0.2 %
LYMPHOCYTES # BLD AUTO: 1.68 K/UL
LYMPHOCYTES NFR BLD AUTO: 25.6 %
MAN DIFF?: NORMAL
MCHC RBC-ENTMCNC: 30.3 PG
MCHC RBC-ENTMCNC: 32.3 GM/DL
MCV RBC AUTO: 93.6 FL
MONOCYTES # BLD AUTO: 0.49 K/UL
MONOCYTES NFR BLD AUTO: 7.5 %
NEUTROPHILS # BLD AUTO: 4.16 K/UL
NEUTROPHILS NFR BLD AUTO: 63.2 %
PLATELET # BLD AUTO: 347 K/UL
POTASSIUM SERPL-SCNC: 4.9 MMOL/L
PROT SERPL-MCNC: 7.6 G/DL
RBC # BLD: 5.32 M/UL
RBC # FLD: 13.5 %
SODIUM SERPL-SCNC: 140 MMOL/L
WBC # FLD AUTO: 6.57 K/UL

## 2024-09-03 ENCOUNTER — RX RENEWAL (OUTPATIENT)
Age: 49
End: 2024-09-03

## 2024-09-07 ENCOUNTER — APPOINTMENT (OUTPATIENT)
Dept: ULTRASOUND IMAGING | Facility: IMAGING CENTER | Age: 49
End: 2024-09-07
Payer: COMMERCIAL

## 2024-09-07 ENCOUNTER — OUTPATIENT (OUTPATIENT)
Dept: OUTPATIENT SERVICES | Facility: HOSPITAL | Age: 49
LOS: 1 days | End: 2024-09-07
Payer: COMMERCIAL

## 2024-09-07 DIAGNOSIS — R79.89 OTHER SPECIFIED ABNORMAL FINDINGS OF BLOOD CHEMISTRY: ICD-10-CM

## 2024-09-07 PROCEDURE — 76705 ECHO EXAM OF ABDOMEN: CPT

## 2024-09-07 PROCEDURE — 76705 ECHO EXAM OF ABDOMEN: CPT | Mod: 26

## 2024-09-12 ENCOUNTER — NON-APPOINTMENT (OUTPATIENT)
Age: 49
End: 2024-09-12

## 2024-09-18 ENCOUNTER — APPOINTMENT (OUTPATIENT)
Dept: CARDIOLOGY | Facility: CLINIC | Age: 49
End: 2024-09-18
Payer: COMMERCIAL

## 2024-09-18 ENCOUNTER — LABORATORY RESULT (OUTPATIENT)
Age: 49
End: 2024-09-18

## 2024-09-18 ENCOUNTER — NON-APPOINTMENT (OUTPATIENT)
Age: 49
End: 2024-09-18

## 2024-09-18 VITALS
HEIGHT: 66 IN | WEIGHT: 175 LBS | RESPIRATION RATE: 16 BRPM | OXYGEN SATURATION: 98 % | HEART RATE: 70 BPM | BODY MASS INDEX: 28.12 KG/M2 | SYSTOLIC BLOOD PRESSURE: 115 MMHG | TEMPERATURE: 97.7 F | DIASTOLIC BLOOD PRESSURE: 78 MMHG

## 2024-09-18 DIAGNOSIS — R01.1 CARDIAC MURMUR, UNSPECIFIED: ICD-10-CM

## 2024-09-18 DIAGNOSIS — I10 ESSENTIAL (PRIMARY) HYPERTENSION: ICD-10-CM

## 2024-09-18 DIAGNOSIS — R73.03 PREDIABETES.: ICD-10-CM

## 2024-09-18 PROCEDURE — G2211 COMPLEX E/M VISIT ADD ON: CPT | Mod: NC

## 2024-09-18 PROCEDURE — 99214 OFFICE O/P EST MOD 30 MIN: CPT | Mod: 25

## 2024-09-18 PROCEDURE — 93000 ELECTROCARDIOGRAM COMPLETE: CPT

## 2024-09-18 RX ORDER — PANTOPRAZOLE 40 MG/1
40 TABLET, DELAYED RELEASE ORAL
Qty: 90 | Refills: 0 | Status: ACTIVE | COMMUNITY
Start: 2024-09-18

## 2024-09-18 NOTE — PHYSICAL EXAM
[Well Developed] : well developed [Well Nourished] : well nourished [No Acute Distress] : no acute distress [Normal Conjunctiva] : normal conjunctiva [Normal Venous Pressure] : normal venous pressure [No Carotid Bruit] : no carotid bruit [Normal S1, S2] : normal S1, S2 [No Rub] : no rub [Normal] : normal [5th Left ICS - MCL] : palpated at the 5th LICS in the midclavicular line [No Precordial Heave] : no precordial heave was noted [Normal Rate] : normal [Rhythm Regular] : regular [Normal S1] : normal S1 [Normal S2] : normal S2 [No Gallop] : no gallop heard [I] : a grade 1 [No Pitting Edema] : no pitting edema present [2+] : left 2+ [No Abnormalities] : the abdominal aorta was not enlarged and no bruit was heard [Clear Lung Fields] : clear lung fields [Good Air Entry] : good air entry [No Respiratory Distress] : no respiratory distress  [Soft] : abdomen soft [Non Tender] : non-tender [No Masses/organomegaly] : no masses/organomegaly [Normal Bowel Sounds] : normal bowel sounds [Normal Gait] : normal gait [No Edema] : no edema [No Cyanosis] : no cyanosis [No Clubbing] : no clubbing [No Varicosities] : no varicosities [No Rash] : no rash [No Skin Lesions] : no skin lesions [No Focal Deficits] : no focal deficits [Moves all extremities] : moves all extremities [Normal Speech] : normal speech [Alert and Oriented] : alert and oriented [Normal memory] : normal memory [S3] : no S3 [S4] : no S4 [Right Carotid Bruit] : no bruit heard over the right carotid [Left Carotid Bruit] : no bruit heard over the left carotid [Right Femoral Bruit] : no bruit heard over the right femoral artery [Left Femoral Bruit] : no bruit heard over the left femoral artery

## 2024-09-18 NOTE — DISCUSSION/SUMMARY
[FreeTextEntry1] : Dr. Farias-(PRIOR VISIT and PMH WITH Dr. Farias): This is a 49-year-old male with past medical history significant of hypertension, ulcerative colitis, dyspepsia, who comes in for cardiac follow-up evaluation. He denies shortness of breath, palpitations, dizziness, PND orthopnea or syncope. He has no history of rheumatic fever.  He does not drink excessive caffeine or alcohol. Cardiac risk factors include hypertension, inflammatory bowel disease (is a risk enhancing feature), and positive family history of heart disease (his grandfather had a significant myocardial infarction age 45. The patient is on infusion therapy for his ulcerative colitis. The patient receives infusions every 8 weeks with [facility] at [facility].  His blood pressure has been stable on amlodipine 5 mg daily. Patient had a normal exercise stress test February 16, 2024. Lipid panel done March 3, 2023 demonstrated cholesterol 177, HDL 42, triglycerides of 100, LDL direct 115, non-HDL cholesterol 135 mg/dL. The patient had normal exercise stress test September 27, 2022. He will have new blood work done today for lipid panel and electrolytes. Electrocardiogram done March 16, 2022 demonstrated normal sinus rhythm rate 65 bpm is otherwise unremarkable. Echo Doppler examination done at [facility] August 19, 2020 demonstrated physiologic mitral and tricuspid valve regurgitation with normal left ventricular ejection fraction of 61%.  He is currently hemodynamically stable and asymptomatic from a cardiac standpoint. Blood work done September 29, 2020 demonstrated cholesterol 135, HDL 38, triglycerides 190, LDL cholesterol 71 mg/dL. The patient reports that he had a normal stress echocardiogram the week of March 7, 2022. The patient understands that aerobic exercises must be increased to 40 minutes 4 times per week. A detailed discussion of lifestyle modification was done today. The patient has a good understanding of the diagnosis, and treatment plan. Lifestyle modification was also outlined.

## 2024-09-18 NOTE — REASON FOR VISIT
[CV Risk Factors and Non-Cardiac Disease] : CV risk factors and non-cardiac disease [Hypertension] : hypertension [FreeTextEntry3] : Dr. Jean Corley [FreeTextEntry1] : This is a 47-year-old male with past medical history significant of hypertension, ulcerative colitis, dyspepsia, who comes in for follow-up cardiac evaluation. Patient states he is generally feeling well today however, he reports occasional dyspnea on exertion. He denies chest pain, lightheadedness, palpitations, dizziness, PND, orthopnea or syncope. He has no history of rheumatic fever.  He does not drink excessive caffeine or alcohol. Cardiac risk factors include hypertension, and positive family history of heart disease (his grandfather had a significant myocardial infarction age 45. His blood pressure is under adequate control on Norvasc 5 mg daily. The patient receives infusions every 8 weeks with ENTYVIO The patient reports that he had a normal stress echocardiogram the week of March 7, 2022.

## 2024-09-18 NOTE — ASSESSMENT
[FreeTextEntry1] : This is a 49-year-old male with past medical history significant of hypertension, ulcerative colitis, dyspepsia, who comes in for cardiac follow-up evaluation.  He has no history of rheumatic fever.  He does not drink excessive caffeine or alcohol.  Cardiac risk factors include hypertension, inflammatory bowel disease (is a risk enhancing feature), and positive family history of heart disease (his grandfather had a significant myocardial infarction age 45.   HPI: He is feeling generally well today and denies chest pain, dizziness, heart palpitations, recent episodes of syncope or falls, SOB, or dyspnea at this time.  Current Medications: Amlodipine 5 mg daily.   The patient is on infusion therapy for his ulcerative colitis and receives infusions every 8 weeks with ENTYVIO at [facility].    BLOOD PRESSURE: Well controlled.    BLOOD WORK: -New blood work was done 09/18/2024 to evaluate lipid profile, CBC, BMP, hepatic function, A1C and TSH. -Lipid panel done February 2024 demonstrated triglyceride 97, cholesterol 194, HDL 47, , non-, . -Lipid panel done March 3, 2023 demonstrated cholesterol 177, HDL 42, triglycerides of 100, LDL direct 115, non-HDL cholesterol 135 mg/dL.   TESTING/REPORTS: -EKG done 09/18/2024 demonstrated regular sinus rhythm rate 70 bpm otherwise unremarkable.   -Patient had a normal exercise stress test February 16, 2024.  -Echocardiogram done October 2023 demonstrated normal left ventricular systolic function EF 64%, minimal tricuspid regurgitation, physiologic mitral regurgitation.  -EKG done 03/03/2023 demonstrated sinus bradycardia rate 59 bpm otherwise unremarkable.   -Electrocardiogram done March 16, 2022 demonstrated normal sinus rhythm rate 65 bpm is otherwise unremarkable.  -Echo Doppler examination done at [facility] August 19, 2020 demonstrated physiologic mitral and tricuspid valve regurgitation with normal left ventricular ejection fraction of 61%.   PLAN: -He will continue his current medications and contact the office if problems arise before next follow-up appointment. -Patient will schedule echocardiogram doppler examination to evaluate murmur, left ventricular function, chamber size, and rule out hypertrophy. -He will follow-up with his PCP routinely.    I have discussed the plan of care with PAUL DAMICO and he will follow up in 6 months. They are compliant with all of their medications.     The patient understands that aerobic exercises must be increased to 40 minutes 4 times/week and a detailed discussion of lifestyle modification was done today.   The patient has a good understanding of the diagnosis, treatment plan and lifestyle modification.   They will contact me at the office for any questions with their care or any changes in their health status.   The patient was discussed with supervision physician Dr. Kilo Farias at the time of the visit and the plan of care will be carried out as noted above.     JULITA Parks NP

## 2024-10-15 ENCOUNTER — OUTPATIENT (OUTPATIENT)
Dept: OUTPATIENT SERVICES | Facility: HOSPITAL | Age: 49
LOS: 1 days | Discharge: HOME | End: 2024-10-15
Payer: COMMERCIAL

## 2024-10-15 ENCOUNTER — APPOINTMENT (OUTPATIENT)
Dept: GASTROENTEROLOGY | Facility: HOSPITAL | Age: 49
End: 2024-10-15

## 2024-10-15 ENCOUNTER — RESULT REVIEW (OUTPATIENT)
Age: 49
End: 2024-10-15

## 2024-10-15 PROCEDURE — C1889: CPT

## 2024-10-15 PROCEDURE — 45380 COLONOSCOPY AND BIOPSY: CPT

## 2024-10-15 PROCEDURE — 88305 TISSUE EXAM BY PATHOLOGIST: CPT | Mod: 26

## 2024-10-15 PROCEDURE — 88305 TISSUE EXAM BY PATHOLOGIST: CPT

## 2024-10-15 DEVICE — CLIP RESOLUTION 360 ULTRA 235CM 20/BX: Type: IMPLANTABLE DEVICE | Status: FUNCTIONAL

## 2024-10-15 NOTE — PRE-ANESTHESIA EVALUATION ADULT - NSANTHPMHFT_GEN_ALL_CORE
> 4 METs no cardiac or resp disease > 4 METs no cardiac or resp disease  Htn  GERD controlled  RINA no treatment

## 2024-10-15 NOTE — PRE-ANESTHESIA EVALUATION ADULT - NSANTHOSAYNRD_GEN_A_CORE
No. RINA screening performed.  STOP BANG Legend: 0-2 = LOW Risk; 3-4 = INTERMEDIATE Risk; 5-8 = HIGH Risk Yes

## 2024-10-16 ENCOUNTER — APPOINTMENT (OUTPATIENT)
Dept: RHEUMATOLOGY | Facility: CLINIC | Age: 49
End: 2024-10-16
Payer: COMMERCIAL

## 2024-10-16 VITALS
RESPIRATION RATE: 15 BRPM | OXYGEN SATURATION: 96 % | TEMPERATURE: 98.4 F | DIASTOLIC BLOOD PRESSURE: 83 MMHG | HEART RATE: 69 BPM | SYSTOLIC BLOOD PRESSURE: 127 MMHG

## 2024-10-16 VITALS
HEART RATE: 58 BPM | RESPIRATION RATE: 16 BRPM | DIASTOLIC BLOOD PRESSURE: 86 MMHG | OXYGEN SATURATION: 96 % | TEMPERATURE: 98.2 F | SYSTOLIC BLOOD PRESSURE: 133 MMHG

## 2024-10-16 DIAGNOSIS — K51.90 ULCERATIVE COLITIS, UNSPECIFIED, W/OUT COMPLICATIONS: ICD-10-CM

## 2024-10-16 LAB — SURGICAL PATHOLOGY STUDY: SIGNIFICANT CHANGE UP

## 2024-10-16 PROCEDURE — 96413 CHEMO IV INFUSION 1 HR: CPT

## 2024-10-16 PROCEDURE — 36415 COLL VENOUS BLD VENIPUNCTURE: CPT

## 2024-10-16 RX ORDER — VEDOLIZUMAB 300 MG/5ML
300 INJECTION, POWDER, LYOPHILIZED, FOR SOLUTION INTRAVENOUS
Qty: 0 | Refills: 0 | Status: COMPLETED | OUTPATIENT
Start: 2024-10-10

## 2024-10-17 LAB
ALBUMIN SERPL ELPH-MCNC: 4.7 G/DL
ALP BLD-CCNC: 83 U/L
ALT SERPL-CCNC: 44 U/L
ANION GAP SERPL CALC-SCNC: 12 MMOL/L
AST SERPL-CCNC: 24 U/L
BASOPHILS # BLD AUTO: 0.01 K/UL
BASOPHILS NFR BLD AUTO: 0.2 %
BILIRUB SERPL-MCNC: 0.8 MG/DL
BUN SERPL-MCNC: 8 MG/DL
CALCIUM SERPL-MCNC: 9.1 MG/DL
CHLORIDE SERPL-SCNC: 101 MMOL/L
CO2 SERPL-SCNC: 28 MMOL/L
CREAT SERPL-MCNC: 1 MG/DL
CRP SERPL-MCNC: 4 MG/L
EGFR: 91 ML/MIN/1.73M2
EOSINOPHIL # BLD AUTO: 0.14 K/UL
EOSINOPHIL NFR BLD AUTO: 2.6 %
GLUCOSE SERPL-MCNC: 110 MG/DL
HCT VFR BLD CALC: 45 %
HGB BLD-MCNC: 15 G/DL
IMM GRANULOCYTES NFR BLD AUTO: 0.2 %
LYMPHOCYTES # BLD AUTO: 1.53 K/UL
LYMPHOCYTES NFR BLD AUTO: 28.3 %
MAN DIFF?: NORMAL
MCHC RBC-ENTMCNC: 29.9 PG
MCHC RBC-ENTMCNC: 33.3 GM/DL
MCV RBC AUTO: 89.8 FL
MONOCYTES # BLD AUTO: 0.39 K/UL
MONOCYTES NFR BLD AUTO: 7.2 %
NEUTROPHILS # BLD AUTO: 3.33 K/UL
NEUTROPHILS NFR BLD AUTO: 61.5 %
PLATELET # BLD AUTO: 373 K/UL
POTASSIUM SERPL-SCNC: 4.8 MMOL/L
PROT SERPL-MCNC: 7.3 G/DL
RBC # BLD: 5.01 M/UL
RBC # FLD: 12.8 %
SODIUM SERPL-SCNC: 141 MMOL/L
WBC # FLD AUTO: 5.41 K/UL

## 2024-10-29 ENCOUNTER — APPOINTMENT (OUTPATIENT)
Dept: GASTROENTEROLOGY | Facility: CLINIC | Age: 49
End: 2024-10-29
Payer: COMMERCIAL

## 2024-10-29 DIAGNOSIS — K62.3 RECTAL PROLAPSE: ICD-10-CM

## 2024-10-29 DIAGNOSIS — K51.90 ULCERATIVE COLITIS, UNSPECIFIED, W/OUT COMPLICATIONS: ICD-10-CM

## 2024-10-29 DIAGNOSIS — R19.5 OTHER FECAL ABNORMALITIES: ICD-10-CM

## 2024-10-29 PROCEDURE — 99442: CPT

## 2024-11-04 ENCOUNTER — APPOINTMENT (OUTPATIENT)
Dept: SURGERY | Facility: CLINIC | Age: 49
End: 2024-11-04

## 2024-11-04 VITALS
TEMPERATURE: 97.7 F | WEIGHT: 175 LBS | SYSTOLIC BLOOD PRESSURE: 123 MMHG | BODY MASS INDEX: 28.12 KG/M2 | HEIGHT: 66 IN | DIASTOLIC BLOOD PRESSURE: 84 MMHG

## 2024-11-04 PROCEDURE — 45300 PROCTOSIGMOIDOSCOPY DX: CPT

## 2024-11-04 PROCEDURE — 99203 OFFICE O/P NEW LOW 30 MIN: CPT | Mod: 25

## 2024-12-05 ENCOUNTER — NON-APPOINTMENT (OUTPATIENT)
Age: 49
End: 2024-12-05

## 2024-12-11 ENCOUNTER — APPOINTMENT (OUTPATIENT)
Dept: RHEUMATOLOGY | Facility: CLINIC | Age: 49
End: 2024-12-11
Payer: COMMERCIAL

## 2024-12-11 VITALS
DIASTOLIC BLOOD PRESSURE: 85 MMHG | OXYGEN SATURATION: 98 % | TEMPERATURE: 98.4 F | RESPIRATION RATE: 15 BRPM | SYSTOLIC BLOOD PRESSURE: 132 MMHG | HEART RATE: 77 BPM

## 2024-12-11 VITALS
OXYGEN SATURATION: 97 % | HEART RATE: 70 BPM | RESPIRATION RATE: 15 BRPM | TEMPERATURE: 98.2 F | SYSTOLIC BLOOD PRESSURE: 132 MMHG | DIASTOLIC BLOOD PRESSURE: 88 MMHG

## 2024-12-11 DIAGNOSIS — K51.90 ULCERATIVE COLITIS, UNSPECIFIED, W/OUT COMPLICATIONS: ICD-10-CM

## 2024-12-11 PROCEDURE — 96413 CHEMO IV INFUSION 1 HR: CPT

## 2024-12-11 PROCEDURE — 36415 COLL VENOUS BLD VENIPUNCTURE: CPT

## 2024-12-11 RX ORDER — VEDOLIZUMAB 300 MG/5ML
300 INJECTION, POWDER, LYOPHILIZED, FOR SOLUTION INTRAVENOUS
Qty: 0 | Refills: 0 | Status: COMPLETED | OUTPATIENT
Start: 2024-12-05

## 2024-12-12 LAB
ALBUMIN SERPL ELPH-MCNC: 4.6 G/DL
ALP BLD-CCNC: 96 U/L
ALT SERPL-CCNC: 44 U/L
ANION GAP SERPL CALC-SCNC: 12 MMOL/L
AST SERPL-CCNC: 31 U/L
BASOPHILS # BLD AUTO: 0.02 K/UL
BASOPHILS NFR BLD AUTO: 0.3 %
BILIRUB SERPL-MCNC: 0.6 MG/DL
BUN SERPL-MCNC: 12 MG/DL
CALCIUM SERPL-MCNC: 9.9 MG/DL
CHLORIDE SERPL-SCNC: 102 MMOL/L
CO2 SERPL-SCNC: 26 MMOL/L
CREAT SERPL-MCNC: 1.02 MG/DL
CRP SERPL-MCNC: 10 MG/L
EGFR: 89 ML/MIN/1.73M2
EOSINOPHIL # BLD AUTO: 0.17 K/UL
EOSINOPHIL NFR BLD AUTO: 2.7 %
GLUCOSE SERPL-MCNC: 119 MG/DL
HCT VFR BLD CALC: 49.5 %
HGB BLD-MCNC: 15.9 G/DL
IMM GRANULOCYTES NFR BLD AUTO: 0.2 %
LYMPHOCYTES # BLD AUTO: 1.43 K/UL
LYMPHOCYTES NFR BLD AUTO: 22.8 %
MAN DIFF?: NORMAL
MCHC RBC-ENTMCNC: 29.4 PG
MCHC RBC-ENTMCNC: 32.1 G/DL
MCV RBC AUTO: 91.7 FL
MONOCYTES # BLD AUTO: 0.52 K/UL
MONOCYTES NFR BLD AUTO: 8.3 %
NEUTROPHILS # BLD AUTO: 4.11 K/UL
NEUTROPHILS NFR BLD AUTO: 65.7 %
PLATELET # BLD AUTO: 383 K/UL
POTASSIUM SERPL-SCNC: 4.8 MMOL/L
PROT SERPL-MCNC: 7.6 G/DL
RBC # BLD: 5.4 M/UL
RBC # FLD: 12.7 %
SODIUM SERPL-SCNC: 141 MMOL/L
WBC # FLD AUTO: 6.26 K/UL

## 2025-02-04 ENCOUNTER — APPOINTMENT (OUTPATIENT)
Dept: RHEUMATOLOGY | Facility: CLINIC | Age: 50
End: 2025-02-04
Payer: COMMERCIAL

## 2025-02-04 VITALS
DIASTOLIC BLOOD PRESSURE: 83 MMHG | HEART RATE: 63 BPM | SYSTOLIC BLOOD PRESSURE: 126 MMHG | RESPIRATION RATE: 15 BRPM | TEMPERATURE: 97.8 F | OXYGEN SATURATION: 97 %

## 2025-02-04 VITALS
TEMPERATURE: 98.1 F | SYSTOLIC BLOOD PRESSURE: 133 MMHG | HEART RATE: 71 BPM | OXYGEN SATURATION: 97 % | RESPIRATION RATE: 15 BRPM | DIASTOLIC BLOOD PRESSURE: 87 MMHG

## 2025-02-04 DIAGNOSIS — K51.90 ULCERATIVE COLITIS, UNSPECIFIED, W/OUT COMPLICATIONS: ICD-10-CM

## 2025-02-04 PROCEDURE — 36415 COLL VENOUS BLD VENIPUNCTURE: CPT

## 2025-02-04 PROCEDURE — 96413 CHEMO IV INFUSION 1 HR: CPT

## 2025-02-04 RX ORDER — VEDOLIZUMAB 300 MG/5ML
300 INJECTION, POWDER, LYOPHILIZED, FOR SOLUTION INTRAVENOUS
Qty: 0 | Refills: 0 | Status: COMPLETED | OUTPATIENT
Start: 2025-01-29

## 2025-02-05 LAB
ALBUMIN SERPL ELPH-MCNC: 4.5 G/DL
ALP BLD-CCNC: 87 U/L
ALT SERPL-CCNC: 42 U/L
ANION GAP SERPL CALC-SCNC: 10 MMOL/L
AST SERPL-CCNC: 27 U/L
BASOPHILS # BLD AUTO: 0.01 K/UL
BASOPHILS NFR BLD AUTO: 0.2 %
BILIRUB SERPL-MCNC: 0.8 MG/DL
BUN SERPL-MCNC: 10 MG/DL
CALCIUM SERPL-MCNC: 9.2 MG/DL
CHLORIDE SERPL-SCNC: 102 MMOL/L
CO2 SERPL-SCNC: 28 MMOL/L
CREAT SERPL-MCNC: 0.96 MG/DL
CRP SERPL-MCNC: 5 MG/L
EGFR: 96 ML/MIN/1.73M2
EOSINOPHIL # BLD AUTO: 0.13 K/UL
EOSINOPHIL NFR BLD AUTO: 2.5 %
GLUCOSE SERPL-MCNC: 122 MG/DL
HCT VFR BLD CALC: 45 %
HGB BLD-MCNC: 15.3 G/DL
IMM GRANULOCYTES NFR BLD AUTO: 0.2 %
LYMPHOCYTES # BLD AUTO: 1.34 K/UL
LYMPHOCYTES NFR BLD AUTO: 25.8 %
MAN DIFF?: NORMAL
MCHC RBC-ENTMCNC: 30.3 PG
MCHC RBC-ENTMCNC: 34 G/DL
MCV RBC AUTO: 89.1 FL
MONOCYTES # BLD AUTO: 0.46 K/UL
MONOCYTES NFR BLD AUTO: 8.8 %
NEUTROPHILS # BLD AUTO: 3.25 K/UL
NEUTROPHILS NFR BLD AUTO: 62.5 %
PLATELET # BLD AUTO: 320 K/UL
POTASSIUM SERPL-SCNC: 5.1 MMOL/L
PROT SERPL-MCNC: 7.3 G/DL
RBC # BLD: 5.05 M/UL
RBC # FLD: 13.2 %
SODIUM SERPL-SCNC: 140 MMOL/L
WBC # FLD AUTO: 5.2 K/UL

## 2025-02-27 ENCOUNTER — RX RENEWAL (OUTPATIENT)
Age: 50
End: 2025-02-27

## 2025-03-08 RX ORDER — BUDESONIDE 3 MG/1
3 CAPSULE, COATED PELLETS ORAL
Qty: 63 | Refills: 0 | Status: ACTIVE | COMMUNITY
Start: 2025-03-08 | End: 1900-01-01

## 2025-03-09 ENCOUNTER — INPATIENT (INPATIENT)
Facility: HOSPITAL | Age: 50
LOS: 0 days | Discharge: HOME | End: 2025-03-10
Attending: INTERNAL MEDICINE | Admitting: STUDENT IN AN ORGANIZED HEALTH CARE EDUCATION/TRAINING PROGRAM
Payer: COMMERCIAL

## 2025-03-09 VITALS
DIASTOLIC BLOOD PRESSURE: 86 MMHG | HEIGHT: 66 IN | SYSTOLIC BLOOD PRESSURE: 132 MMHG | WEIGHT: 164.91 LBS | RESPIRATION RATE: 18 BRPM | HEART RATE: 82 BPM | TEMPERATURE: 98 F | OXYGEN SATURATION: 95 %

## 2025-03-09 DIAGNOSIS — K51.90 ULCERATIVE COLITIS, UNSPECIFIED, WITHOUT COMPLICATIONS: ICD-10-CM

## 2025-03-09 DIAGNOSIS — Z29.9 ENCOUNTER FOR PROPHYLACTIC MEASURES, UNSPECIFIED: ICD-10-CM

## 2025-03-09 DIAGNOSIS — K21.9 GASTRO-ESOPHAGEAL REFLUX DISEASE WITHOUT ESOPHAGITIS: ICD-10-CM

## 2025-03-09 DIAGNOSIS — I10 ESSENTIAL (PRIMARY) HYPERTENSION: ICD-10-CM

## 2025-03-09 LAB
ADD ON TEST-SPECIMEN IN LAB: SIGNIFICANT CHANGE UP
ALBUMIN SERPL ELPH-MCNC: 4.3 G/DL — SIGNIFICANT CHANGE UP (ref 3.3–5)
ALP SERPL-CCNC: 82 U/L — SIGNIFICANT CHANGE UP (ref 40–120)
ALT FLD-CCNC: 36 U/L — SIGNIFICANT CHANGE UP (ref 10–45)
ANION GAP SERPL CALC-SCNC: 10 MMOL/L — SIGNIFICANT CHANGE UP (ref 5–17)
APPEARANCE UR: CLEAR — SIGNIFICANT CHANGE UP
AST SERPL-CCNC: 22 U/L — SIGNIFICANT CHANGE UP (ref 10–40)
BASOPHILS # BLD AUTO: 0.01 K/UL — SIGNIFICANT CHANGE UP (ref 0–0.2)
BASOPHILS NFR BLD AUTO: 0.1 % — SIGNIFICANT CHANGE UP (ref 0–2)
BILIRUB SERPL-MCNC: 0.9 MG/DL — SIGNIFICANT CHANGE UP (ref 0.2–1.2)
BILIRUB UR-MCNC: NEGATIVE — SIGNIFICANT CHANGE UP
BLD GP AB SCN SERPL QL: NEGATIVE — SIGNIFICANT CHANGE UP
BUN SERPL-MCNC: 8 MG/DL — SIGNIFICANT CHANGE UP (ref 7–23)
C DIFF GDH STL QL: NEGATIVE — SIGNIFICANT CHANGE UP
C DIFF GDH STL QL: SIGNIFICANT CHANGE UP
CALCIUM SERPL-MCNC: 9.2 MG/DL — SIGNIFICANT CHANGE UP (ref 8.4–10.5)
CHLORIDE SERPL-SCNC: 103 MMOL/L — SIGNIFICANT CHANGE UP (ref 96–108)
CO2 SERPL-SCNC: 26 MMOL/L — SIGNIFICANT CHANGE UP (ref 22–31)
COLOR SPEC: SIGNIFICANT CHANGE UP
CREAT SERPL-MCNC: 0.98 MG/DL — SIGNIFICANT CHANGE UP (ref 0.5–1.3)
DIFF PNL FLD: NEGATIVE — SIGNIFICANT CHANGE UP
EGFR: 93 ML/MIN/1.73M2 — SIGNIFICANT CHANGE UP
EGFR: 93 ML/MIN/1.73M2 — SIGNIFICANT CHANGE UP
EOSINOPHIL # BLD AUTO: 0.05 K/UL — SIGNIFICANT CHANGE UP (ref 0–0.5)
EOSINOPHIL NFR BLD AUTO: 0.7 % — SIGNIFICANT CHANGE UP (ref 0–6)
GI PCR PANEL: SIGNIFICANT CHANGE UP
GLUCOSE SERPL-MCNC: 113 MG/DL — HIGH (ref 70–99)
GLUCOSE UR QL: NEGATIVE MG/DL — SIGNIFICANT CHANGE UP
HCT VFR BLD CALC: 44.6 % — SIGNIFICANT CHANGE UP (ref 39–50)
HGB BLD-MCNC: 15.3 G/DL — SIGNIFICANT CHANGE UP (ref 13–17)
IMM GRANULOCYTES NFR BLD AUTO: 0.3 % — SIGNIFICANT CHANGE UP (ref 0–0.9)
KETONES UR-MCNC: NEGATIVE MG/DL — SIGNIFICANT CHANGE UP
LACTATE SERPL-SCNC: 1.4 MMOL/L — SIGNIFICANT CHANGE UP (ref 0.5–2)
LEUKOCYTE ESTERASE UR-ACNC: NEGATIVE — SIGNIFICANT CHANGE UP
LIDOCAIN IGE QN: 45 U/L — SIGNIFICANT CHANGE UP (ref 7–60)
LYMPHOCYTES # BLD AUTO: 1.71 K/UL — SIGNIFICANT CHANGE UP (ref 1–3.3)
LYMPHOCYTES # BLD AUTO: 24.1 % — SIGNIFICANT CHANGE UP (ref 13–44)
MCHC RBC-ENTMCNC: 31 PG — SIGNIFICANT CHANGE UP (ref 27–34)
MCHC RBC-ENTMCNC: 34.3 G/DL — SIGNIFICANT CHANGE UP (ref 32–36)
MCV RBC AUTO: 90.3 FL — SIGNIFICANT CHANGE UP (ref 80–100)
MONOCYTES # BLD AUTO: 0.45 K/UL — SIGNIFICANT CHANGE UP (ref 0–0.9)
MONOCYTES NFR BLD AUTO: 6.3 % — SIGNIFICANT CHANGE UP (ref 2–14)
NEUTROPHILS # BLD AUTO: 4.86 K/UL — SIGNIFICANT CHANGE UP (ref 1.8–7.4)
NEUTROPHILS NFR BLD AUTO: 68.5 % — SIGNIFICANT CHANGE UP (ref 43–77)
NITRITE UR-MCNC: NEGATIVE — SIGNIFICANT CHANGE UP
NRBC BLD AUTO-RTO: 0 /100 WBCS — SIGNIFICANT CHANGE UP (ref 0–0)
PH UR: 6 — SIGNIFICANT CHANGE UP (ref 5–8)
PLATELET # BLD AUTO: 317 K/UL — SIGNIFICANT CHANGE UP (ref 150–400)
POTASSIUM SERPL-MCNC: 4.1 MMOL/L — SIGNIFICANT CHANGE UP (ref 3.5–5.3)
POTASSIUM SERPL-SCNC: 4.1 MMOL/L — SIGNIFICANT CHANGE UP (ref 3.5–5.3)
PROT SERPL-MCNC: 7.5 G/DL — SIGNIFICANT CHANGE UP (ref 6–8.3)
PROT UR-MCNC: SIGNIFICANT CHANGE UP MG/DL
RBC # BLD: 4.94 M/UL — SIGNIFICANT CHANGE UP (ref 4.2–5.8)
RBC # FLD: 12.8 % — SIGNIFICANT CHANGE UP (ref 10.3–14.5)
RH IG SCN BLD-IMP: POSITIVE — SIGNIFICANT CHANGE UP
RH IG SCN BLD-IMP: POSITIVE — SIGNIFICANT CHANGE UP
SODIUM SERPL-SCNC: 139 MMOL/L — SIGNIFICANT CHANGE UP (ref 135–145)
SP GR SPEC: 1.02 — SIGNIFICANT CHANGE UP (ref 1–1.03)
UROBILINOGEN FLD QL: 0.2 MG/DL — SIGNIFICANT CHANGE UP (ref 0.2–1)
WBC # BLD: 7.1 K/UL — SIGNIFICANT CHANGE UP (ref 3.8–10.5)
WBC # FLD AUTO: 7.1 K/UL — SIGNIFICANT CHANGE UP (ref 3.8–10.5)

## 2025-03-09 PROCEDURE — 99222 1ST HOSP IP/OBS MODERATE 55: CPT | Mod: GC

## 2025-03-09 PROCEDURE — 99223 1ST HOSP IP/OBS HIGH 75: CPT

## 2025-03-09 PROCEDURE — 74177 CT ABD & PELVIS W/CONTRAST: CPT | Mod: 26

## 2025-03-09 PROCEDURE — 99285 EMERGENCY DEPT VISIT HI MDM: CPT

## 2025-03-09 RX ORDER — MAGNESIUM, ALUMINUM HYDROXIDE 200-200 MG
30 TABLET,CHEWABLE ORAL EVERY 4 HOURS
Refills: 0 | Status: DISCONTINUED | OUTPATIENT
Start: 2025-03-09 | End: 2025-03-10

## 2025-03-09 RX ORDER — ACETAMINOPHEN 500 MG/5ML
650 LIQUID (ML) ORAL EVERY 6 HOURS
Refills: 0 | Status: DISCONTINUED | OUTPATIENT
Start: 2025-03-09 | End: 2025-03-10

## 2025-03-09 RX ORDER — AMLODIPINE BESYLATE 10 MG/1
5 TABLET ORAL DAILY
Refills: 0 | Status: DISCONTINUED | OUTPATIENT
Start: 2025-03-09 | End: 2025-03-10

## 2025-03-09 RX ORDER — AMLODIPINE BESYLATE 10 MG/1
1 TABLET ORAL
Refills: 0 | DISCHARGE

## 2025-03-09 RX ORDER — IOHEXOL 350 MG/ML
30 INJECTION, SOLUTION INTRAVENOUS ONCE
Refills: 0 | Status: COMPLETED | OUTPATIENT
Start: 2025-03-09 | End: 2025-03-09

## 2025-03-09 RX ORDER — MELATONIN 5 MG
3 TABLET ORAL AT BEDTIME
Refills: 0 | Status: DISCONTINUED | OUTPATIENT
Start: 2025-03-09 | End: 2025-03-10

## 2025-03-09 RX ORDER — ALBUTEROL SULFATE 2.5 MG/3ML
2.5 VIAL, NEBULIZER (ML) INHALATION ONCE
Refills: 0 | Status: DISCONTINUED | OUTPATIENT
Start: 2025-03-09 | End: 2025-03-09

## 2025-03-09 RX ORDER — ONDANSETRON HCL/PF 4 MG/2 ML
4 VIAL (ML) INJECTION EVERY 8 HOURS
Refills: 0 | Status: DISCONTINUED | OUTPATIENT
Start: 2025-03-09 | End: 2025-03-09

## 2025-03-09 RX ADMIN — IOHEXOL 30 MILLILITER(S): 350 INJECTION, SOLUTION INTRAVENOUS at 16:19

## 2025-03-09 RX ADMIN — Medication 1000 MILLILITER(S): at 16:19

## 2025-03-09 NOTE — H&P ADULT - NSHPLABSRESULTS_GEN_ALL_CORE
LABS:                         15.3   7.10  )-----------( 317      ( 09 Mar 2025 16:04 )             44.6     03-09    139  |  103  |  8   ----------------------------<  113[H]  4.1   |  26  |  0.98    Ca    9.2      09 Mar 2025 16:04    TPro  7.5  /  Alb  4.3  /  TBili  0.9  /  DBili  x   /  AST  22  /  ALT  36  /  AlkPhos  82  03-09      Urinalysis Basic - ( 09 Mar 2025 16:04 )    Color: x / Appearance: x / SG: x / pH: x  Gluc: 113 mg/dL / Ketone: x  / Bili: x / Urobili: x   Blood: x / Protein: x / Nitrite: x   Leuk Esterase: x / RBC: x / WBC x   Sq Epi: x / Non Sq Epi: x / Bacteria: x            Lactate, Blood: 1.4 mmol/L (03-09 @ 15:55)      RADIOLOGY, EKG & ADDITIONAL TESTS: < from: CT Abdomen and Pelvis w/ Oral Cont and w/ IV Cont (03.09.25 @ 17:57) >    IMPRESSION:  Colitis involving the descending and sigmoid colon.    < end of copied text >

## 2025-03-09 NOTE — ED ADULT TRIAGE NOTE - CHIEF COMPLAINT QUOTE
Abd pain and bloody stool x1 day. Hx of Ulcerative colitis and Diverticulosis. AAOx3. Ambulatory. denies fevers, chills, cp, sob, vomiting. -AC use. Abd pain and bloody stool x1 day. Hx of HTN, Ulcerative colitis and Diverticulosis. AAOx3. Ambulatory. denies fevers, chills, cp, sob, vomiting. -AC use. Takes amlodipine and protonix.

## 2025-03-09 NOTE — H&P ADULT - HISTORY OF PRESENT ILLNESS
50 year old male with past medical history significant for hypertension, ulcerative colitis (diagnosed in 2003, currently taking VDZ every 8 weeks since 12/2020, deep remission as of 10/15/2024 colonoscopy), diverticulosis, rectal prolapse, and GERD who presents with abdominal pain, cramping, and bloody diarrhea sent in by gastroenterologist, Dr Johnson. Patient had called the GI office describing new onset hematochezia multiple times throughout the day as well as mild crampy abdominal pain. States that he has lost some weight over the previous few weeks unintentionally, however he related this to being laid off from work. Patient prescribed and attempted budesonide 9mg, however had worsening severity of symptoms 3/9/25. Endorses that he had a very rough night due to worsening hematochezia, loose bowel movements, and abdominal cramping. Said that he could no longer get comfortable in bed and felt increased fatigue. He was advised to present to the ED. Most recent colonoscopy and endoscopy 10/2024 which demonstrated remission of ulcerative colitis. Denies nausea, vomiting, dysphagia, odynophagia, melena, diarrhea, and constipation. Denies new joint pains, fever, and rash. He has lost approximately 10lbs since 11/2024.     ED Course:  Vitals: 97.7 F, HR 82, /86, RR 18 (95%) on RA  Labs: WBC 7.10; Hgb 15.3; Glucose 113  Imaging: CTAP:  Circumferential mural thickening in the descending and sigmoid colon consistent with colitis involving the descending and sigmoid colon.  Consults: GI   Interventions: 1L NS 50 year old male with past medical history significant for hypertension, ulcerative colitis (diagnosed in 2003, currently taking VDZ every 8 weeks since 12/2020, deep remission as of 10/15/2024 colonoscopy), diverticulosis, rectal prolapse, and GERD who presents with abdominal pain, cramping, and bloody diarrhea sent in by gastroenterologist, Dr Johnson. Patient had called the GI office describing new onset hematochezia multiple times throughout the day as well as mild crampy abdominal pain. States that he has lost some weight over the previous few weeks unintentionally, however he related this to being laid off from work. Patient prescribed and attempted budesonide 9mg, however had worsening severity of symptoms 3/9/25. Endorses that he had a very rough night due to worsening hematochezia, loose bowel movements, and abdominal cramping. States he had 10 episodes of clotted and BRBPR independent of bowel movements. He was advised to present to the ED. Per patient, he had half an imodium this morning which controlled the diarrhea but the episodes have resumed and he has had 3 watery bowel movements in the last 20 minutes. Most recent colonoscopy and endoscopy 10/2024 which demonstrated remission of ulcerative colitis.  Denies nausea, vomiting, dysphagia, odynophagia, melena, diarrhea, and constipation. Denies new joint pains, fever, and rash. He has lost approximately 10lbs since 11/2024.     ED Course:  Vitals: 97.7 F, HR 82, /86, RR 18 (95%) on RA  Labs: WBC 7.10; Hgb 15.3; Glucose 113  Imaging: CTAP:  Circumferential mural thickening in the descending and sigmoid colon consistent with colitis involving the descending and sigmoid colon.  Consults: GI   Interventions: 1L NS

## 2025-03-09 NOTE — H&P ADULT - PROBLEM SELECTOR PLAN 4
F: s/p 1L NS   E: replete as needed; Keep K>4, Mg >2   D: CLD --> NPO @ [facility]  DVT Proph: IMPROVE score 0    Dispo: RMF

## 2025-03-09 NOTE — CONSULT NOTE ADULT - SUBJECTIVE AND OBJECTIVE BOX
HPI:  This is a 50 year old male with ulcerative colitis (diagnosed in 2003, currently taking VDZ every 8 weeks since 12/2020, deep remission as of 10/15/2024 colonoscopy), diverticulosis, and rectal prolapse who gastroenterology has been consulted for hematochezia, abdominal pain, and unintentional weight loss. Patient called the after hours office number Saturday 3/8/25 describing new onset hematochezia multiple times throughout the day as well as mild crampy abdominal pain. States that he has lost some weight over the previous few weeks, however he related this to being laid off from work. Patient prescribed and attempted budesonide 9mg, however had worsening severity of symptoms 3/9/25. States that the night was very rough due to worsening hematochezia, loose bowel movements, and abdominal cramping. Said that he could no longer get comfortable in bed and felt increased fatigue. Given the increase in symptom severity, I asked that he present to the ED for in person evaluation. At time of my initial interview, patient appears in no acute distress. Denies nausea, vomiting, dysphagia, odynophagia, melena, diarrhea, and constipation. Denies new joint pains, fever, and rash. He has lost approximately 10lbs since 11/2024. Describes remaining adherent to VDZ as outpatient and has felt well between doses. Most recent colonoscopy 10/2024 with endoscopic and histologic remission of ulcerative colitis.       PAST MEDICAL & SURGICAL HISTORY:    Home Medications:    Allergies    hydrocodone (Unknown)  Biaxin (Unknown)    Intolerances      SOCIAL HISTORY:  Denies tobacco use  Denies alcohol use  Denies drug use    FAMILY HISTORY:    Mother: Healthy  Father: Healthy  MEDICATIONS  (STANDING):    MEDICATIONS  (PRN):      REVIEW OF SYSTEMS:  All 14 review of systems are negative except as noted in HPI.    Vital Signs Last 24 Hrs  T(C): 36.5 (09 Mar 2025 15:44), Max: 36.5 (09 Mar 2025 15:44)  T(F): 97.7 (09 Mar 2025 15:44), Max: 97.7 (09 Mar 2025 15:44)  HR: 82 (09 Mar 2025 15:44) (82 - 82)  BP: 132/86 (09 Mar 2025 15:44) (132/86 - 132/86)  BP(mean): --  RR: 18 (09 Mar 2025 15:44) (18 - 18)  SpO2: 95% (09 Mar 2025 15:44) (95% - 95%)    Parameters below as of 09 Mar 2025 15:44  Patient On (Oxygen Delivery Method): room air          PHYSICAL EXAM:    General: Middle aged male, laying in bed, awake, well developed, well nourished, in no acute distress  Eyes: Anicteric sclerae, moist conjunctivae, extraocular motions intact, pupils equal round and reactive to light  HENT: Pink and moist mucous membranes, good dentition, tongue normal in appearance without lesions and has symmetrical movement, no obvious oral lesions noted, pharynx normal without tonsillar swelling or exudates  Neck: Trachea midline, supple, no obvious lymphadenopathy  Chest: Symmetric appearing, non tender to palpation  Cardiovascular: Regular rate and rhythm, no obvious murmur rub or gallop  Respiratory: Normal respiratory effort, clear lung sounds in anterior and posterior posts bilaterally, no obvious rales ronchi or wheeze  Abdomen: Symmetric appearing, no obvious lesions, non-distended, normal bowel sounds, soft, tender to palpation in left lower quadrant, no rebound or guarding  Extremities: Normal range of motion, no clubbing cyanosis or edema  Neurological: Awake alert and oriented to person place time and situation  Skin: Warm and dry, no obvious rash, no jaundice    LABS:                        15.3   7.10  )-----------( 317      ( 09 Mar 2025 16:04 )             44.6     03-09    139  |  103  |  8   ----------------------------<  113[H]  4.1   |  26  |  0.98    Ca    9.2      09 Mar 2025 16:04    TPro  7.5  /  Alb  4.3  /  TBili  0.9  /  DBili  x   /  AST  22  /  ALT  36  /  AlkPhos  82  03-09            Urinalysis with Rflx Culture (collected 09 Mar 2025 15:53)      RADIOLOGY & ADDITIONAL STUDIES:

## 2025-03-09 NOTE — CONSULT NOTE ADULT - ASSESSMENT
This is a 50 year old male with ulcerative colitis (diagnosed in 2003, currently taking VDZ every 8 weeks since 12/2020, deep remission as of 10/15/2024 colonoscopy), diverticulosis, and rectal prolapse who gastroenterology has been consulted for hematochezia, abdominal pain, and unintentional weight loss. Patient called the after hours office number Saturday 3/8/25 describing new onset hematochezia multiple times throughout the day as well as mild crampy abdominal pain, which has worsened in severity over the next 24 hours. Most recent colonoscopy 10/2024 with endoscopic and histologic remission of ulcerative colitis.     #Hematochezia, abdominal pain, unintentional weight loss approximately 10lbs  #Ulcerative colitis  #Diverticulosis  #Rectal prolapse  - Unclear specific etiology of presenting complaints. Given weight loss, hematochezia, and abdominal pain in the setting of recent life stressor, concerned patient has developed ulcerative colitis flare. Patient with known diverticulosis and rectal prolapse would could be alternative causes for patient's hematochezia, but would not classically present with abdominal pain and unintentional weight loss.  - Patient pending CT abdomen and pelvis with oral and IV contrast  - Please obtain fecal calpro, GI PCR, and C. diff studies  - Please obtain CRP  - Pending the above evaluation, will tentatively plan for flex sig Monday 3/10/25  - Clear liquid diet 3/9/25  - NPO after 11:59pm 3/9/25  - Patient requires 2 tap water enemas prior to flex sig      César Cramer DO  Gastroenterology Fellow  GI Consult Pager Weekdays 7am-5pm: [phone]  Weeknights/Weekend/Holiday Coverage: Please Call the  for Contact Information  Follow Up Questions Welcome via [place] Microsoft Teams Messenger

## 2025-03-09 NOTE — H&P ADULT - NSICDXNOPASTSURGICALHX_GEN_ALL_CORE
7-year-old female presented to the ED complaining of lower back pain status post fall off swing.  Patient states that she was at the playground sitting on a swing when she fell off and landed onto her lower back. Pt otherwise denies hitting her head, LOC, neck pain, dizziness, h/a, visual changes, fever/chills, c/p, sob, abd pain, n/v/c/d, dysuria, numbness/tingling/weakness, saddle anesthesia, urinary/bowel dysfunction and has no other complaints at this time. <-- Click to add NO significant Past Surgical History

## 2025-03-09 NOTE — ED ADULT NURSE NOTE - OBJECTIVE STATEMENT
Patient presents to the ED complaining of lower abdominal pain for the past week. Patient reports that he also has blood in the stool. Reports history of ulcerative colitis and diverticulosis. Denies any fever or chills. History of hernia surgery.

## 2025-03-09 NOTE — ED PROVIDER NOTE - OBJECTIVE STATEMENT
51 y/o m with PMH of UC followed by Dr. Johnson on Entyvio infusions every 8 weeks (last infusion 4 weeks prior) presents to ED with abd pain, cramping, diarrhea, followed by blood diarrhea x 1 day.  Pt spoke to covering physician for Dr. Johnson who advised pt to present to ED.  In ED pt states cramping and bleeding mostly resolved.  Pt took dose of immodium day prior.  Denies weakness, dizziness, syncope, chest pain, shortness of breath.  No prior abd surgeries.  Last colonoscopy 10/24 without inflammation.

## 2025-03-09 NOTE — H&P ADULT - ATTENDING COMMENTS
51 yo M with PMHx HTN, ulcerative colitis, diverticulosis, rectal prolapse, GERD px from home at urging of outpatient GI with 1d hx of hematochezia and generalized crampy abdominal pain, admitted for further evaluation and management.     [ ] GI consulted in ED (Ulcerative colitis)   - NPO with bowel rest    - Fecal calprotectin, GI PCR, C. Diff    - CRP (Added on)   - Tentative plan for flexible sigmoidoscopy 3/10

## 2025-03-09 NOTE — H&P ADULT - NSICDXPASTMEDICALHX_GEN_ALL_CORE_FT
PAST MEDICAL HISTORY:  GERD (gastroesophageal reflux disease)     Hypertension     Ulcerative colitis

## 2025-03-09 NOTE — ED ADULT NURSE NOTE - NSFALLUNIVINTERV_ED_ALL_ED
Bed/Stretcher in lowest position, wheels locked, appropriate side rails in place/Call bell, personal items and telephone in reach/Instruct patient to call for assistance before getting out of bed/chair/stretcher/Non-slip footwear applied when patient is off stretcher/[place] to call system/Physically safe environment - no spills, clutter or unnecessary equipment/Purposeful proactive rounding/Room/bathroom lighting operational, light cord in reach

## 2025-03-09 NOTE — ED PROVIDER NOTE - CARE PLAN
1 Principal Discharge DX:	Ulcerative colitis  Secondary Diagnosis:	Abdominal pain  Secondary Diagnosis:	Bloody stool

## 2025-03-09 NOTE — ED PROVIDER NOTE - CLINICAL SUMMARY MEDICAL DECISION MAKING FREE TEXT BOX
51 y/o m with PMH of UC followed by Dr. Johnson on Entyvio infusions every 8 weeks (last infusion 4 weeks prior) presents to ED with abd pain, cramping, diarrhea, followed by blood diarrhea x 1 day.  Pt spoke to covering physician for Dr. Johnson who advised pt to present to ED.  In ED pt states cramping and bleeding mostly resolved.  Pt took dose of immodium day prior.  Denies weakness, dizziness, syncope, chest pain, shortness of breath.  No prior abd surgeries.  Last colonoscopy 10/24 without inflammation.    VSS  PE well appearing  Labs, CT ordered, GI notified 49 y/o m with PMH of UC followed by Dr. Johnson on Entyvio infusions every 8 weeks (last infusion 4 weeks prior) presents to ED with abd pain, cramping, diarrhea, followed by blood diarrhea x 1 day.  Pt spoke to covering physician for Dr. Johnson who advised pt to present to ED.  In ED pt states cramping and bleeding mostly resolved.  Pt took dose of immodium day prior.  Denies weakness, dizziness, syncope, chest pain, shortness of breath.  No prior abd surgeries.  Last colonoscopy 10/24 without inflammation.    VSS  PE well appearing  Labs, CT ordered, GI notified    [place]:  Patient signed out by Dr. Theodore pending CT result, GI dispo.  Consult note advises admission for planned sigmoidoscopy tomorrow.  MARYCRUZ informed of admission. Patient aware of plan.

## 2025-03-09 NOTE — H&P ADULT - PROBLEM SELECTOR PLAN 1
#Hematochezia, abdominal pain, unintentional weight loss approximately 10lbs  #Ulcerative colitis  #Diverticulosis  #Rectal prolapse  Unclear specific etiology of presenting complaints. Given weight loss, hematochezia, and abdominal pain in the setting of recent life stressor, concerned patient has developed ulcerative colitis flare. Patient with known diverticulosis and rectal prolapse would could be alternative causes for patient's hematochezia, but not classically present with abdominal pain and unintentional weight loss.  CTAP: Circumferential mural thickening in the descending and sigmoid colon consistent with colitis involving the descending and sigmoid colon.  - Plan for flex signmoidoscopy Monday 3/10  - Clear liquid diet  - NPO @ MN  - f/u fecal calpro, GI PCR, and C. diff studies  - f/u CRP  - 2 tap water enemas prior to flex sig

## 2025-03-09 NOTE — H&P ADULT - PROBLEM SELECTOR PLAN 2
Pt w/ Hx of HTN managed w/ amlodipine 5mg QD. Goal BP <130/80.    - Clear liquid diet  - NPO @ MN  - DASH diet following scope  - continue amlodipine 5mg qd

## 2025-03-09 NOTE — CONSULT NOTE ADULT - ATTENDING COMMENTS
50 year old male with ulcerative colitis (diagnosed in 2003, currently taking VDZ every 8 weeks since 12/2020, deep remission as of 10/15/2024 colonoscopy), diverticulosis, and rectal prolapse who gastroenterology has been consulted for hematochezia, abdominal pain, and unintentional weight loss.   CT abdomen Pelvis with PO and IV contrast to gauge extent of inflammation  - Stool studies: fecal calpro, GI PCR, and C. diff studies  - Bowel rest.     Will evaluate studies and consider future treatment based on the studies.

## 2025-03-09 NOTE — H&P ADULT - ASSESSMENT
Pt is 50M w/PMHx of HTN, UC (2003), Diverticulosis, Rectal prolapse, and GERD who presents with weight loss, abdominal pain, cramping, and hematochezia sent in by gastroenterologist, Dr Johnson (most recent C-scope/EGD 10/2024). Admitted for Scope in AM

## 2025-03-09 NOTE — ED ADULT NURSE NOTE - CHIEF COMPLAINT QUOTE
Abd pain and bloody stool x1 day. Hx of HTN, Ulcerative colitis and Diverticulosis. AAOx3. Ambulatory. denies fevers, chills, cp, sob, vomiting. -AC use. Takes amlodipine and protonix.

## 2025-03-09 NOTE — H&P ADULT - NSHPPHYSICALEXAM_GEN_ALL_CORE
T(C): 36.3 (03-09-25 @ 18:17), Max: 36.5 (03-09-25 @ 15:44)  HR: 62 (03-09-25 @ 18:17) (62 - 82)  BP: 122/76 (03-09-25 @ 18:17) (122/76 - 132/86)  RR: 18 (03-09-25 @ 18:17) (18 - 18)  SpO2: 96% (03-09-25 @ 18:17) (95% - 96%)    General: NAD, sitting in bed, speaking in full sentences  HEENT: head NC/AT, no conjunctival injection, EOMI, MMM  Neck: supple, no JVD  Cardio: RRR, +S1/S2, no M/R/G  Resp: lungs CTAB, no cough/wheezes/rales/rhonchi  Abdo: soft, NT, ND, +bowel sounds x4  Extremities: WWP, no edema/cyanosis/clubbing   Vasc: 2+ radial and DP pulses b/l  Neuro: A&Ox3  Psych: speech non-pressured, thoughts goal-oriented  Skin: dry, intact, no visible jaundice

## 2025-03-10 ENCOUNTER — TRANSCRIPTION ENCOUNTER (OUTPATIENT)
Age: 50
End: 2025-03-10

## 2025-03-10 ENCOUNTER — RESULT REVIEW (OUTPATIENT)
Age: 50
End: 2025-03-10

## 2025-03-10 ENCOUNTER — RX RENEWAL (OUTPATIENT)
Age: 50
End: 2025-03-10

## 2025-03-10 VITALS
SYSTOLIC BLOOD PRESSURE: 132 MMHG | DIASTOLIC BLOOD PRESSURE: 83 MMHG | TEMPERATURE: 98 F | RESPIRATION RATE: 18 BRPM | HEART RATE: 72 BPM | OXYGEN SATURATION: 93 %

## 2025-03-10 LAB
ADD ON TEST-SPECIMEN IN LAB: SIGNIFICANT CHANGE UP
ALBUMIN SERPL ELPH-MCNC: 4.5 G/DL — SIGNIFICANT CHANGE UP (ref 3.3–5)
ALP SERPL-CCNC: 85 U/L — SIGNIFICANT CHANGE UP (ref 40–120)
ALT FLD-CCNC: 37 U/L — SIGNIFICANT CHANGE UP (ref 10–45)
ANION GAP SERPL CALC-SCNC: 9 MMOL/L — SIGNIFICANT CHANGE UP (ref 5–17)
APTT BLD: 34.4 SEC — SIGNIFICANT CHANGE UP (ref 24.5–35.6)
AST SERPL-CCNC: 23 U/L — SIGNIFICANT CHANGE UP (ref 10–40)
BILIRUB SERPL-MCNC: 1.2 MG/DL — SIGNIFICANT CHANGE UP (ref 0.2–1.2)
BLD GP AB SCN SERPL QL: NEGATIVE — SIGNIFICANT CHANGE UP
BUN SERPL-MCNC: 6 MG/DL — LOW (ref 7–23)
CALCIUM SERPL-MCNC: 8.8 MG/DL — SIGNIFICANT CHANGE UP (ref 8.4–10.5)
CHLORIDE SERPL-SCNC: 100 MMOL/L — SIGNIFICANT CHANGE UP (ref 96–108)
CO2 SERPL-SCNC: 29 MMOL/L — SIGNIFICANT CHANGE UP (ref 22–31)
CREAT SERPL-MCNC: 0.95 MG/DL — SIGNIFICANT CHANGE UP (ref 0.5–1.3)
CRP SERPL-MCNC: 4.9 MG/L — HIGH (ref 0–4)
EGFR: 96 ML/MIN/1.73M2 — SIGNIFICANT CHANGE UP
EGFR: 96 ML/MIN/1.73M2 — SIGNIFICANT CHANGE UP
GLUCOSE SERPL-MCNC: 93 MG/DL — SIGNIFICANT CHANGE UP (ref 70–99)
INR BLD: 1.06 — SIGNIFICANT CHANGE UP (ref 0.85–1.16)
MAGNESIUM SERPL-MCNC: 2.2 MG/DL — SIGNIFICANT CHANGE UP (ref 1.6–2.6)
PHOSPHATE SERPL-MCNC: 3 MG/DL — SIGNIFICANT CHANGE UP (ref 2.5–4.5)
POTASSIUM SERPL-MCNC: 3.5 MMOL/L — SIGNIFICANT CHANGE UP (ref 3.5–5.3)
POTASSIUM SERPL-SCNC: 3.5 MMOL/L — SIGNIFICANT CHANGE UP (ref 3.5–5.3)
PROT SERPL-MCNC: 7.6 G/DL — SIGNIFICANT CHANGE UP (ref 6–8.3)
PROTHROM AB SERPL-ACNC: 12.2 SEC — SIGNIFICANT CHANGE UP (ref 9.9–13.4)
RH IG SCN BLD-IMP: POSITIVE — SIGNIFICANT CHANGE UP
SODIUM SERPL-SCNC: 138 MMOL/L — SIGNIFICANT CHANGE UP (ref 135–145)

## 2025-03-10 PROCEDURE — 86901 BLOOD TYPING SEROLOGIC RH(D): CPT

## 2025-03-10 PROCEDURE — 36415 COLL VENOUS BLD VENIPUNCTURE: CPT

## 2025-03-10 PROCEDURE — 85730 THROMBOPLASTIN TIME PARTIAL: CPT

## 2025-03-10 PROCEDURE — 99239 HOSP IP/OBS DSCHRG MGMT >30: CPT | Mod: GC

## 2025-03-10 PROCEDURE — 88305 TISSUE EXAM BY PATHOLOGIST: CPT | Mod: 26

## 2025-03-10 PROCEDURE — 45331 SIGMOIDOSCOPY AND BIOPSY: CPT

## 2025-03-10 PROCEDURE — 88305 TISSUE EXAM BY PATHOLOGIST: CPT

## 2025-03-10 PROCEDURE — 87449 NOS EACH ORGANISM AG IA: CPT

## 2025-03-10 PROCEDURE — 83605 ASSAY OF LACTIC ACID: CPT

## 2025-03-10 PROCEDURE — 87324 CLOSTRIDIUM AG IA: CPT

## 2025-03-10 PROCEDURE — 87507 IADNA-DNA/RNA PROBE TQ 12-25: CPT

## 2025-03-10 PROCEDURE — 74177 CT ABD & PELVIS W/CONTRAST: CPT | Mod: MC

## 2025-03-10 PROCEDURE — 85025 COMPLETE CBC W/AUTO DIFF WBC: CPT

## 2025-03-10 PROCEDURE — 83735 ASSAY OF MAGNESIUM: CPT

## 2025-03-10 PROCEDURE — 86900 BLOOD TYPING SEROLOGIC ABO: CPT

## 2025-03-10 PROCEDURE — 86850 RBC ANTIBODY SCREEN: CPT

## 2025-03-10 PROCEDURE — 83690 ASSAY OF LIPASE: CPT

## 2025-03-10 PROCEDURE — 84100 ASSAY OF PHOSPHORUS: CPT

## 2025-03-10 PROCEDURE — 99285 EMERGENCY DEPT VISIT HI MDM: CPT

## 2025-03-10 PROCEDURE — 86140 C-REACTIVE PROTEIN: CPT

## 2025-03-10 PROCEDURE — 80053 COMPREHEN METABOLIC PANEL: CPT

## 2025-03-10 PROCEDURE — 81003 URINALYSIS AUTO W/O SCOPE: CPT

## 2025-03-10 PROCEDURE — 85610 PROTHROMBIN TIME: CPT

## 2025-03-10 PROCEDURE — 83993 ASSAY FOR CALPROTECTIN FECAL: CPT

## 2025-03-10 RX ORDER — PREDNISONE 20 MG/1
40 TABLET ORAL EVERY 24 HOURS
Refills: 0 | Status: DISCONTINUED | OUTPATIENT
Start: 2025-03-10 | End: 2025-03-10

## 2025-03-10 RX ORDER — BUDESONIDE 0.25 MG/2ML
1 SUSPENSION RESPIRATORY (INHALATION)
Refills: 0 | DISCHARGE

## 2025-03-10 RX ORDER — BUDESONIDE 0.25 MG/2ML
3 SUSPENSION RESPIRATORY (INHALATION)
Refills: 0 | Status: DISCONTINUED | OUTPATIENT
Start: 2025-03-10 | End: 2025-03-10

## 2025-03-10 RX ADMIN — AMLODIPINE BESYLATE 5 MILLIGRAM(S): 10 TABLET ORAL at 05:52

## 2025-03-10 RX ADMIN — Medication 100 MILLIEQUIVALENT(S): at 08:56

## 2025-03-10 RX ADMIN — Medication 40 MILLIGRAM(S): at 06:37

## 2025-03-10 RX ADMIN — PREDNISONE 40 MILLIGRAM(S): 20 TABLET ORAL at 14:11

## 2025-03-10 RX ADMIN — Medication 20 MILLIEQUIVALENT(S): at 14:16

## 2025-03-10 NOTE — PROGRESS NOTE ADULT - PROBLEM SELECTOR PLAN 2
Pt w/ Hx of HTN managed w/ amlodipine 5mg QD. Goal BP <130/80.    - Clear liquid diet  - NPO @ MN  - DASH diet following scope  - continue amlodipine 5mg qd Pt w/ Hx of HTN managed w/ amlodipine 5mg QD. Goal BP <130/80.    - DASH diet following scope  - continue home medication

## 2025-03-10 NOTE — DISCHARGE NOTE PROVIDER - NSDCMRMEDTOKEN_GEN_ALL_CORE_FT
amLODIPine 5 mg oral tablet: 1 tab(s) orally once a day  budesonide 3 mg oral delayed release capsule: 1 cap(s) orally 3 times a day  Entyvio Pen 108 mg/0.68 mL subcutaneous solution: 108 milligram(s) subcutaneously every 2 months  pantoprazole 40 mg oral delayed release tablet: 1 tab(s) orally once a day   amLODIPine 5 mg oral tablet: 1 tab(s) orally once a day  Entyvio Pen 108 mg/0.68 mL subcutaneous solution: 108 milligram(s) subcutaneously every 2 months  pantoprazole 40 mg oral delayed release tablet: 1 tab(s) orally once a day  predniSONE 10 mg oral tablet: 4 tab(s) orally once a day Please start taking 4 tablets (40mg) per day for 6 more days starting 3/11/25. After 6 days, if you are not experiencing any more abdominal pain, blood in stool, start taking 3 tablets (30mg) per day until you see the GI team outpatient after discharge.

## 2025-03-10 NOTE — PROGRESS NOTE ADULT - PROBLEM SELECTOR PLAN 1
#Hematochezia, abdominal pain, unintentional weight loss approximately 10lbs  #Ulcerative colitis  #Diverticulosis  #Rectal prolapse  Unclear specific etiology of presenting complaints. Given weight loss, hematochezia, and abdominal pain in the setting of recent life stressor, concerned patient has developed ulcerative colitis flare. Patient with known diverticulosis and rectal prolapse would could be alternative causes for patient's hematochezia, but not classically present with abdominal pain and unintentional weight loss.  CTAP: Circumferential mural thickening in the descending and sigmoid colon consistent with colitis involving the descending and sigmoid colon.  - Plan for flex signmoidoscopy Monday 3/10  - Clear liquid diet  - NPO @ MN  - f/u fecal calpro, GI PCR, and C. diff studies  - f/u CRP  - 2 tap water enemas prior to flex sig #Hematochezia  #Ulcerative colitis  #Diverticulosis  #Rectal prolapse  - GI consulted, plan for flex sigmoidoscopy Monday 3/10 AM    -- 2 tap water enemas prior to flex sig    -- NPO @ MN  - Clear liquid diet  - f/u fecal calpro  - trend CRP  - Tylenol 650 mg q6 for pain  - Mylanta suspension for dyspepsia #Hematochezia  #Ulcerative colitis   #Diverticulosis  #Rectal prolapse  - GI consulted, plan for flex sigmoidoscopy Monday 3/10 AM    -- 2 tap water enemas prior to flex sig    -- NPO @ MN  - f/u fecal calpro to r/o IBD flare  - trend CRP  - f/u re: budesonide 3mg (TID), not on formulary  - Tylenol 650 mg q6 for pain  - Mylanta suspension for dyspepsia #Hematochezia  #Ulcerative colitis   #Diverticulosis  #Rectal prolapse  - GI consulted, plan for flex sigmoidoscopy Monday 3/10 AM    -- 2 tap water enemas prior to flex sig  - f/u fecal calpro to r/o IBD flare  - trend CRP if worsening sx  - f/u re: budesonide 3mg (TID), not on formulary  - Tylenol 650 mg q6 for pain  - Mylanta suspension for dyspepsia #Hematochezia  #Ulcerative colitis   #Diverticulosis  #Rectal prolapse  - GI consulted, plan for flex sigmoidoscopy Monday 3/10 AM    -- tap water enema x 2 prior to flex sig  - f/u fecal calpro to r/o IBD flare  - trend CRP if worsening sx  - c/w Budesonide 3mg TID  - Tylenol 650 mg q6 for pain  - Mylanta suspension for dyspepsia #Hematochezia  #Ulcerative colitis   #Diverticulosis  #Rectal prolapse  - GI consulted, plan for flex sigmoidoscopy Monday 3/10 AM    -- tap water enema x 2 prior to flex sig  - f/u fecal calpro to r/o IBD flare  - trend CRP if worsening sx  - dc Budesonide 3mg TID; starting prednisone 40mg qd w/ taper   - Tylenol 650 mg q6 for pain  - Mylanta suspension for dyspepsia

## 2025-03-10 NOTE — DISCHARGE NOTE PROVIDER - HOSPITAL COURSE
50 year old Male with past medical history significant for hypertension, ulcerative colitis (diagnosed in 2003, currently taking VDZ every 8 weeks since 12/2020, deep remission as of 10/15/2024 colonoscopy), diverticulosis, rectal prolapse, and GERD who presents with abdominal pain, cramping, and bloody diarrhea sent in by gastroenterologist, Dr Johnson. Patient had called the GI office describing new onset hematochezia multiple times throughout the day as well as mild crampy abdominal pain. States that he has lost some weight over the previous few weeks unintentionally, however he related this to being laid off from work. Patient prescribed and attempted budesonide 9mg, however had worsening severity of symptoms 3/9/25. Endorses that he had a very rough night due to worsening hematochezia, loose bowel movements, and abdominal cramping. States he had 10 episodes of clotted and BRBPR independent of bowel movements. He was advised to present to the ED. Per patient, he had half an imodium this morning which controlled the diarrhea but the episodes have resumed and he has had 3 watery bowel movements in the last 20 minutes. Most recent colonoscopy and endoscopy 10/2024 which demonstrated remission of ulcerative colitis.  Denies nausea, vomiting, dysphagia, odynophagia, melena, diarrhea, and constipation. Denies new joint pains, fever, and rash. He has lost approximately 10lbs since 11/2024.     ED Course:  Vitals: 97.7 F, HR 82, /86, RR 18 (95%) on RA  Labs: WBC 7.10; Hgb 15.3; Glucose 113  Imaging: CTAP:  Circumferential mural thickening in the descending and sigmoid colon consistent with colitis involving the descending and sigmoid colon.  Consults: GI   Interventions: 1L NS    3/10: Flex sig 50 year old Male with past medical history of hypertension, ulcerative colitis (diagnosed in 2005, currently taking VDZ every 8 weeks since 12/2020, deep remission as of 10/15/2024 colonoscopy), diverticulosis, rectal prolapse, and GERD who presented to ED on 3/9/2025 with tenesmus and bloody diarrhea since 3/8/2025. Pt prescribed outpatient budesonide (3mg TID) by gastroenterologist, Dr Johnson. Symptoms worsened prompting ED visit. Denies any fever, chills, or night sweats. Reports weight loss of ~10lbs, unintentional since 11/2024. No nausea, vomiting, or hematemesis. No recent infection or antibiotic use. No dysphagia, odynophagia, melena, diarrhea, and constipation. Denies new joint pains or rash. No chest pain, SOB, or weakness. Admitted for flexible sigmoidoscopy.     3/9 - ED Course:  Vitals: 97.7 F, HR 82, /86, RR 18 (95%) on RA  Labs: WBC 7.10; Hgb 15.3; Glucose 113, Lactate 1.4, CRP 4.5  Imaging: CTAP: Circumferential mural thickening in the descending and sigmoid colon consistent with colitis involving the descending and sigmoid colon.  Consults: GI   Interventions: 1L NS    3/10: Pt was given 2 tap water enemas in preparation for flexible sigmoidoscopy. 50 year old Male with past medical history of hypertension, ulcerative colitis (diagnosed in 2005, currently taking VDZ every 8 weeks since 12/2020, deep remission as of 10/15/2024 colonoscopy), diverticulosis, rectal prolapse, and GERD who presented to ED on 3/9/2025 with tenesmus and bloody diarrhea since 3/8/2025. Pt prescribed outpatient budesonide (3mg TID) by gastroenterologist, Dr Johnson. Symptoms worsened prompting ED visit. Denies any fever, chills, or night sweats. Reports weight loss of ~10lbs, unintentional since 11/2024. No nausea, vomiting, or hematemesis. No recent infection or antibiotic use. No dysphagia, odynophagia, melena, diarrhea, and constipation. Denies new joint pains or rash. No chest pain, SOB, or weakness. Admitted for flexible sigmoidoscopy.     CT A/P: Colitis involving the descending and sigmoid colon.  Flex sigmoidoscopy (3/10/25): Inflammation localized to descending and sigmoid, measured area from 40cm to  28cm insertion from anus. Inflammation most consistent with Travis I-II findings.       3/9 - ED Course:  Vitals: 97.7 F, HR 82, /86, RR 18 (95%) on RA  Labs: WBC 7.10; Hgb 15.3; Glucose 113, Lactate 1.4, CRP 4.5  Imaging: CTAP: Circumferential mural thickening in the descending and sigmoid colon consistent with colitis involving the descending and sigmoid colon.  Consults: GI   Interventions: 1L NS    3/10: Pt was given 2 tap water enemas in preparation for flexible sigmoidoscopy.     #Ulcerative colitis.   #Hematochezia  #Ulcerative colitis   #Diverticulosis  #Rectal prolapse  CT A/P: Colitis involving the descending and sigmoid colon.  Flex sigmoidoscopy (3/10/25): Inflammation localized to descending and sigmoid, measured area from 40cm to  28cm insertion from anus. Inflammation most consistent with Travis I-II findings.   - will dc on prednisone 40mg taper; 40mg x 7d then decreasing to 30mg if he is asymptomatic (no hematochezia or abd pain). Pt to continue 30mg qd until seen in office by the IBD team, which will be scheduled by GI team. 50 year old Male with past medical history of hypertension, ulcerative colitis (diagnosed in 2005, currently taking VDZ every 8 weeks since 12/2020, deep remission as of 10/15/2024 colonoscopy), diverticulosis, rectal prolapse, and GERD who presented to ED on 3/9/2025 with tenesmus and bloody diarrhea since 3/8/2025. Pt prescribed outpatient budesonide (3mg TID) by gastroenterologist, Dr Johnson. Symptoms worsened prompting ED visit. Denies any fever, chills, or night sweats. Reports weight loss of ~10lbs, unintentional since 11/2024. No nausea, vomiting, or hematemesis. No recent infection or antibiotic use. No dysphagia, odynophagia, melena, diarrhea, and constipation. Denies new joint pains or rash. No chest pain, SOB, or weakness. Admitted for flexible sigmoidoscopy.     3/9 - ED Course:  Vitals: 97.7 F, HR 82, /86, RR 18 (95%) on RA  Labs: WBC 7.10; Hgb 15.3; Glucose 113, Lactate 1.4, CRP 4.5  Imaging: CTAP: Circumferential mural thickening in the descending and sigmoid colon consistent with colitis involving the descending and sigmoid colon.  Consults: GI   Interventions: 1L NS    3/10: Pt was given 2 tap water enemas in preparation for flexible sigmoidoscopy. Flex sigmoidoscopy: Inflammation localized to descending and sigmoid, measured area from 40cm to 28cm insertion from anus. Inflammation most consistent with Travis I-II findings. Will dc on prednisone 40mg taper; 40mg x 7d then decreasing to 30mg if he is asymptomatic (no hematochezia or abd pain). Pt to continue 30mg qd until seen in office by the IBD team, which will be scheduled by GI team. PCP post-discharge follow-up scheduled for 3/12/2025. 50 year old Male with past medical history of hypertension, ulcerative colitis (diagnosed in 2005, currently taking VDZ every 8 weeks since 12/2020, deep remission as of 10/15/2024 colonoscopy), diverticulosis, rectal prolapse, and GERD who presented to ED on 3/9/2025 with tenesmus and bloody diarrhea since 3/8/2025. Pt prescribed outpatient budesonide (3mg TID) by gastroenterologist, Dr Johnson. Symptoms worsened prompting ED visit. Denies any fever, chills, or night sweats. Reports weight loss of ~10lbs, unintentional since 11/2024. No nausea, vomiting, or hematemesis. No recent infection or antibiotic use. No dysphagia, odynophagia, melena, diarrhea, and constipation. Denies new joint pains or rash. No chest pain, SOB, or weakness. Admitted for flexible sigmoidoscopy.     3/9 - ED Course:  Vitals: 97.7 F, HR 82, /86, RR 18 (95%) on RA  Labs: WBC 7.10; Hgb 15.3; Glucose 113, Lactate 1.4, CRP 4.5  Imaging: CTAP: Circumferential mural thickening in the descending and sigmoid colon consistent with colitis involving the descending and sigmoid colon.  Consults: GI   Interventions: 1L NS    3/10: Pt was given 2 tap water enemas in preparation for flexible sigmoidoscopy. Flex sigmoidoscopy: Inflammation localized to descending and sigmoid, measured area from 40cm to 28cm insertion from anus. Inflammation most consistent with Travis I-II findings. Will dc on prednisone 40mg taper; 40mg x 7d then decreasing to 30mg if he is asymptomatic (no hematochezia or abd pain). Pt to continue 30mg qd until seen in office by the IBD team, which will be scheduled by GI team. PCP post-discharge follow-up scheduled for 3/12/2025.     Patient was discharged to: Home   New medications: Prednisone 40mg QD taper  Changes to old medications: none  Medications that were stopped: Budesonide 3mg TID    Items to follow up as outpatient: PCP follow up, Dr. Hart; GI follow up    50 year old Male with past medical history of hypertension, ulcerative colitis (diagnosed in 2005, currently taking VDZ every 8 weeks since 12/2020, deep remission as of 10/15/2024 colonoscopy), diverticulosis, rectal prolapse, and GERD who presented to ED on 3/9/2025 with tenesmus and bloody diarrhea since 3/8/2025. Pt prescribed outpatient budesonide (3mg TID) by gastroenterologist, Dr Johnson. Symptoms worsened prompting ED visit. Denies any fever, chills, or night sweats. Reports weight loss of ~10lbs, unintentional since 11/2024. No nausea, vomiting, or hematemesis. No recent infection or antibiotic use. No dysphagia, odynophagia, melena, diarrhea, and constipation. Denies new joint pains or rash. No chest pain, SOB, or weakness. Admitted for flexible sigmoidoscopy.     3/9 - ED Course:  Vitals: 97.7 F, HR 82, /86, RR 18 (95%) on RA  Labs: WBC 7.10; Hgb 15.3; Glucose 113, Lactate 1.4, CRP 4.5  Imaging: CTAP: Circumferential mural thickening in the descending and sigmoid colon consistent with colitis involving the descending and sigmoid colon.  Consults: GI   Interventions: 1L NS    3/10: Pt was given 2 tap water enemas in preparation for flexible sigmoidoscopy. Flex sigmoidoscopy: Inflammation localized to descending and sigmoid, measured area from 40cm to 28cm insertion from anus. Inflammation most consistent with Travis I-II findings. Will dc on prednisone 40mg taper; 40mg x 7d then decreasing to 30mg if he is asymptomatic (no hematochezia or abd pain). Pt to continue 30mg qd until seen in office by the IBD team, which will be scheduled by GI team. Advised to taper by decreasing by 10mg each week if GI follow up is delayed. PCP post-discharge follow-up scheduled for 3/12/2025.     Patient was discharged to: Home   New medications: Prednisone 40mg QD taper  Changes to old medications: none  Medications that were stopped: Budesonide 3mg TID    Items to follow up as outpatient: PCP follow up, Dr. Hart; GI follow up    50 year old Male with past medical history of hypertension, ulcerative colitis (diagnosed in 2005, currently taking VDZ every 8 weeks since 12/2020, deep remission as of 10/15/2024 colonoscopy), diverticulosis, rectal prolapse, and GERD who presented to ED on 3/9/2025 with tenesmus and bloody diarrhea since 3/8/2025. Pt prescribed outpatient budesonide (3mg TID) by gastroenterologist, Dr Johnson. Symptoms worsened prompting ED visit. Denies any fever, chills, or night sweats. Reports weight loss of ~10lbs, unintentional since 11/2024. No nausea, vomiting, or hematemesis. No recent infection or antibiotic use. No dysphagia, odynophagia, melena, diarrhea, and constipation. Denies new joint pains or rash. No chest pain, SOB, or weakness. Admitted for flexible sigmoidoscopy.       Discharge Diagnosis :     # Ulcerative Colitis Flare   # Lower GI bleeding     3/9 - ED Course:  Vitals: 97.7 F, HR 82, /86, RR 18 (95%) on RA  Labs: WBC 7.10; Hgb 15.3; Glucose 113, Lactate 1.4, CRP 4.5  Imaging: CTAP: Circumferential mural thickening in the descending and sigmoid colon consistent with colitis involving the descending and sigmoid colon.  Consults: GI   Interventions: 1L NS    3/10: Pt was given 2 tap water enemas in preparation for flexible sigmoidoscopy. Flex sigmoidoscopy: Inflammation localized to descending and sigmoid, measured area from 40cm to 28cm insertion from anus. Inflammation most consistent with Travis I-II findings. Will dc on prednisone 40mg taper; 40mg x 7d then decreasing to 30mg if he is asymptomatic (no hematochezia or abd pain). Pt to continue 30mg qd until seen in office by the IBD team, which will be scheduled by GI team. Advised to taper by decreasing by 10mg each week if GI follow up is delayed. PCP post-discharge follow-up scheduled for 3/12/2025.     Patient was discharged to: Home   New medications: Prednisone 40mg QD taper  Changes to old medications: none  Medications that were stopped: Budesonide 3mg TID    Items to follow up as outpatient: PCP follow up, Dr. Hart; GI follow up       GENERAL: NAD, lying in bed comfortably  EYES: EOMI, PERRLA, conjunctiva and sclera clear  ENT: Moist mucous membranes, no mucosal lesions, normal dentition   NECK: Supple, No JVD  CHEST/LUNG: Clear to auscultation bilaterally; No rales, rhonchi, wheezing, or rubs. Unlabored respirations  HEART: Regular rate and rhythm; No murmurs, rubs, or gallops  ABDOMEN: Bowel sounds present; Soft, Nontender, Nondistended. No hepatomegaly  EXTREMITIES:  2+ Peripheral Pulses,  No clubbing, cyanosis, or edema  NERVOUS SYSTEM:  Alert & Oriented X3, speech clear. No deficits   MSK: FROM all 4 extremities, full and equal strength  SKIN: No rashes or lesions

## 2025-03-10 NOTE — DISCHARGE NOTE NURSING/CASE MANAGEMENT/SOCIAL WORK - NSDCPEFALRISK_GEN_ALL_CORE
For information on Fall & Injury Prevention, visit: [email or web address] OR  [email or web address] OR  https://www.cdc.gov/steadi/patient.html

## 2025-03-10 NOTE — DISCHARGE NOTE PROVIDER - NSDCCPCAREPLAN_GEN_ALL_CORE_FT
PRINCIPAL DISCHARGE DIAGNOSIS  Diagnosis: Ulcerative colitis  Assessment and Plan of Treatment: You came in with what appears to be ulcerative colitis flare. GI specialist team followed you during the admission and performed sigmoidoscopy, which showed inflammation localized to descending and sigmoid colon.  ========================  1. Please start taking prednisone as instructed: a) 4 tablets of prednisone 10mg tablet per day for 6 days starting on 3/11/25, b) start taking 3 tablets of prednisone 10mg per day (if your symptoms have resolved; if not resolved, continue taking 4 tablet until resolution of symptoms, then 3 tablets) until you see the GI team  2. GI team, who followed you here during the admission will contact you to schedule an appointment for a follow up. Please follow through with them.         SECONDARY DISCHARGE DIAGNOSES  Diagnosis: Abdominal pain  Assessment and Plan of Treatment:     Diagnosis: Bloody stool  Assessment and Plan of Treatment:      PRINCIPAL DISCHARGE DIAGNOSIS  Diagnosis: Ulcerative colitis  Assessment and Plan of Treatment: You came in with what appears to be ulcerative colitis flare. GI specialist team followed you during the admission and performed sigmoidoscopy, which showed inflammation localized to descending and sigmoid colon.  ========================  1. Please start taking prednisone as instructed: a) 4 tablets of prednisone 10mg tablet per day for 6 days starting on 3/11/25, b) start taking 3 tablets of prednisone 10mg per day (if your symptoms have resolved; if not resolved, continue taking 4 tablet until resolution of symptoms, then 3 tablets) until you see the GI team. In case you don't see them or have appointment that is far out (>2 weeks), decrease prednisone by 1 tablet every week (ex: week #3, take 2 tablet daily, week #4, take 1 tablet daily).   2. GI team, who followed you here during the admission will contact you to schedule an appointment for a follow up. Please follow through with them.   3. Stop taking budesonide that you have at home        SECONDARY DISCHARGE DIAGNOSES  Diagnosis: Abdominal pain  Assessment and Plan of Treatment:     Diagnosis: Bloody stool  Assessment and Plan of Treatment:

## 2025-03-10 NOTE — DISCHARGE NOTE PROVIDER - NSDCFUSCHEDAPPT_GEN_ALL_CORE_FT
[facility]  CARDIOLOGY [address] [facility]   Scheduled Appointment: 03/27/2025    Kilo Farias  [facility]  CARDIOLOGY [address] [facility]   Scheduled Appointment: 03/27/2025    [facility] [address]  Scheduled Appointment: 04/02/2025

## 2025-03-10 NOTE — CHART NOTE - NSCHARTNOTEFT_GEN_A_CORE
Record from [facility] (provided by pt on portal)    CT A/P w/ contrast (3/8/25): 1) acute sigmoid diverticulitis w/ no organized collection or free air, 2) 2-3 mm pulm nodule may be infectious or inflammatory, which do not require follow up imaging based on current clinical guideline in a patient w/o known risk factor, however optional chest CT in 12mo could be considered as clinically indicated. No mention of abscess

## 2025-03-10 NOTE — PROGRESS NOTE ADULT - SUBJECTIVE AND OBJECTIVE BOX
Progress Note  INCOMPLETE NOTE    INTERVAL EVENTS:   No acute events overnight.    SUBJECTIVE:   Patient seen and examined at bedside. Condition largely unchanged from yesterday. No acute complaints at this time.    ROS:  Negative unless otherwise stated above.    PHYSICAL EXAM:  General: Alert and oriented x 3. No acute distress.   HEENT: Moist mucous membranes. Anicteric. No cervical lymphadenopathy.  Cardiovascular: Regular rate and rhythm. No murmur. Normal JVP.  Lungs: Clear to auscultation bilaterally. No accessory muscle use.  Abdomen: Soft, non-tender and non-distended. No palpable masses.  Extremities: No edema. Non-tender. Warm.  Skin: No rashes or lesions.   Neurologic: No apparent focal neurological deficits. CN II-XII grossly intact, but not individually tested.  Psychiatric: Cooperative. Appropriate mood and affect.    VITAL SIGNS:  Vital Signs Last 24 Hrs  T(C): 36.4 (10 Mar 2025 05:11), Max: 36.8 (09 Mar 2025 21:58)  T(F): 97.6 (10 Mar 2025 05:11), Max: 98.2 (09 Mar 2025 21:58)  HR: 57 (10 Mar 2025 05:11) (56 - 82)  BP: 123/73 (10 Mar 2025 05:11) (122/76 - 145/95)  BP(mean): 90 (10 Mar 2025 05:11) (90 - 112)  RR: 18 (10 Mar 2025 05:11) (16 - 18)  SpO2: 95% (10 Mar 2025 05:11) (95% - 98%)    Parameters below as of 10 Mar 2025 05:11  Patient On (Oxygen Delivery Method): room air      INPATIENT MEDICATIONS:   MEDICATIONS  (STANDING):  amLODIPine   Tablet 5 milliGRAM(s) Oral daily  pantoprazole    Tablet 40 milliGRAM(s) Oral before breakfast    MEDICATIONS  (PRN):  acetaminophen     Tablet .. 650 milliGRAM(s) Oral every 6 hours PRN Temp greater or equal to 38C (100.4F), Mild Pain (1 - 3)  aluminum hydroxide/magnesium hydroxide/simethicone Suspension 30 milliLiter(s) Oral every 4 hours PRN Dyspepsia  melatonin 3 milliGRAM(s) Oral at bedtime PRN Insomnia    HOME MEDICATIONS  amLODIPine 5 mg oral tablet: 1 tab(s) orally once a day  budesonide 3 mg oral delayed release capsule: 3 cap(s) orally 3 times a day  Entyvio Pen 108 mg/0.68 mL subcutaneous solution: 108 milligram(s) subcutaneously every 2 months  pantoprazole 40 mg oral delayed release tablet: 1 tab(s) orally once a day    ALLERGIES:  Allergies    hydrocodone (Unknown)  Biaxin (Unknown)    Intolerances    LABS:                       15.3   7.10  )-----------( 317      ( 09 Mar 2025 16:04 )             44.6     03-09    139  |  103  |  8   ----------------------------<  113[H]  4.1   |  26  |  0.98    Ca    9.2      09 Mar 2025 16:04    TPro  7.5  /  Alb  4.3  /  TBili  0.9  /  DBili  x   /  AST  22  /  ALT  36  /  AlkPhos  82  03-09      Urinalysis Basic - ( 09 Mar 2025 16:04 )    Color: x / Appearance: x / SG: x / pH: x  Gluc: 113 mg/dL / Ketone: x  / Bili: x / Urobili: x   Blood: x / Protein: x / Nitrite: x   Leuk Esterase: x / RBC: x / WBC x   Sq Epi: x / Non Sq Epi: x / Bacteria: x      CAPILLARY BLOOD GLUCOSE        RADIOLOGY & ADDITIONAL TESTS: Reviewed. Progress Note  INCOMPLETE NOTE    INTERVAL EVENTS:   No acute events overnight.     SUBJECTIVE:   Patient seen and examined at bedside. Condition largely unchanged from yesterday. Reports abdominal pain is now dull and unchanged since last night. No acute complaints at this time.    ROS:  Negative unless otherwise stated above.    PHYSICAL EXAM:  General: Alert and oriented x 3. No acute distress.   HEENT: Mildly dry mucous membranes. Anicteric. No cervical lymphadenopathy.  Cardiovascular: Regular rate and rhythm. No murmur. Normal JVP.  Lungs: Clear to auscultation bilaterally. No accessory muscle use.  Abdomen: Soft and non-distended. Diffusely tender in all quadrants. No palpable masses.  Extremities: No edema. Warm and well perfused.  Skin: No rashes or lesions. Pallor.     VITAL SIGNS:  Vital Signs Last 24 Hrs  T(C): 36.4 (10 Mar 2025 05:11), Max: 36.8 (09 Mar 2025 21:58)  T(F): 97.6 (10 Mar 2025 05:11), Max: 98.2 (09 Mar 2025 21:58)  HR: 57 (10 Mar 2025 05:11) (56 - 82)  BP: 123/73 (10 Mar 2025 05:11) (122/76 - 145/95)  BP(mean): 90 (10 Mar 2025 05:11) (90 - 112)  RR: 18 (10 Mar 2025 05:11) (16 - 18)  SpO2: 95% (10 Mar 2025 05:11) (95% - 98%)    Parameters below as of 10 Mar 2025 05:11  Patient On (Oxygen Delivery Method): room air      INPATIENT MEDICATIONS:   MEDICATIONS  (STANDING):  amLODIPine   Tablet 5 milliGRAM(s) Oral daily  pantoprazole    Tablet 40 milliGRAM(s) Oral before breakfast    MEDICATIONS  (PRN):  acetaminophen     Tablet .. 650 milliGRAM(s) Oral every 6 hours PRN Temp greater or equal to 38C (100.4F), Mild Pain (1 - 3)  aluminum hydroxide/magnesium hydroxide/simethicone Suspension 30 milliLiter(s) Oral every 4 hours PRN Dyspepsia  melatonin 3 milliGRAM(s) Oral at bedtime PRN Insomnia    HOME MEDICATIONS  amLODIPine 5 mg oral tablet: 1 tab(s) orally once a day  budesonide 3 mg oral delayed release capsule: 3 cap(s) orally 3 times a day  Entyvio Pen 108 mg/0.68 mL subcutaneous solution: 108 milligram(s) subcutaneously every 2 months  pantoprazole 40 mg oral delayed release tablet: 1 tab(s) orally once a day    ALLERGIES:  Allergies    hydrocodone (Unknown)  Biaxin (Unknown)    Intolerances    LABS:                       15.3   7.10  )-----------( 317      ( 09 Mar 2025 16:04 )             44.6     03-09    139  |  103  |  8   ----------------------------<  113[H]  4.1   |  26  |  0.98    Ca    9.2      09 Mar 2025 16:04    TPro  7.5  /  Alb  4.3  /  TBili  0.9  /  DBili  x   /  AST  22  /  ALT  36  /  AlkPhos  82  03-09      Urinalysis Basic - ( 09 Mar 2025 16:04 )    Color: x / Appearance: x / SG: x / pH: x  Gluc: 113 mg/dL / Ketone: x  / Bili: x / Urobili: x   Blood: x / Protein: x / Nitrite: x   Leuk Esterase: x / RBC: x / WBC x   Sq Epi: x / Non Sq Epi: x / Bacteria: x      CAPILLARY BLOOD GLUCOSE        RADIOLOGY & ADDITIONAL TESTS: Reviewed. Progress Note  INCOMPLETE NOTE    INTERVAL EVENTS:   No acute events overnight. Enemas completed this morning without incident.     SUBJECTIVE:   Patient seen and examined at bedside. Reports abdominal pain is now dull and improved since last night. Denies any bloody BMs this AM. No acute complaints at this time.    ROS:  Negative unless otherwise stated above.    PHYSICAL EXAM:  General: Alert and oriented x 3. No acute distress.   HEENT: Mildly dry mucous membranes. Anicteric. No cervical lymphadenopathy.  Cardiovascular: Regular rate and rhythm. No murmur. Normal JVP.  Lungs: Clear to auscultation bilaterally. No accessory muscle use.  Abdomen: Soft and non-distended. Diffusely tender in all quadrants. No palpable masses.  Extremities: No edema. Warm and well perfused.  Skin: No rashes or lesions. Pallor.     VITAL SIGNS:  Vital Signs Last 24 Hrs  T(C): 36.4 (10 Mar 2025 05:11), Max: 36.8 (09 Mar 2025 21:58)  T(F): 97.6 (10 Mar 2025 05:11), Max: 98.2 (09 Mar 2025 21:58)  HR: 57 (10 Mar 2025 05:11) (56 - 82)  BP: 123/73 (10 Mar 2025 05:11) (122/76 - 145/95)  BP(mean): 90 (10 Mar 2025 05:11) (90 - 112)  RR: 18 (10 Mar 2025 05:11) (16 - 18)  SpO2: 95% (10 Mar 2025 05:11) (95% - 98%)    Parameters below as of 10 Mar 2025 05:11  Patient On (Oxygen Delivery Method): room air      INPATIENT MEDICATIONS:   MEDICATIONS  (STANDING):  amLODIPine   Tablet 5 milliGRAM(s) Oral daily  pantoprazole    Tablet 40 milliGRAM(s) Oral before breakfast    MEDICATIONS  (PRN):  acetaminophen     Tablet .. 650 milliGRAM(s) Oral every 6 hours PRN Temp greater or equal to 38C (100.4F), Mild Pain (1 - 3)  aluminum hydroxide/magnesium hydroxide/simethicone Suspension 30 milliLiter(s) Oral every 4 hours PRN Dyspepsia  melatonin 3 milliGRAM(s) Oral at bedtime PRN Insomnia    HOME MEDICATIONS  amLODIPine 5 mg oral tablet: 1 tab(s) orally once a day  budesonide 3 mg oral delayed release capsule: 3 cap(s) orally 3 times a day  Entyvio Pen 108 mg/0.68 mL subcutaneous solution: 108 milligram(s) subcutaneously every 2 months  pantoprazole 40 mg oral delayed release tablet: 1 tab(s) orally once a day    ALLERGIES:  Allergies    hydrocodone (Unknown)  Biaxin (Unknown)    Intolerances    LABS:                       15.3   7.10  )-----------( 317      ( 09 Mar 2025 16:04 )             44.6     03-09    139  |  103  |  8   ----------------------------<  113[H]  4.1   |  26  |  0.98    Ca    9.2      09 Mar 2025 16:04    TPro  7.5  /  Alb  4.3  /  TBili  0.9  /  DBili  x   /  AST  22  /  ALT  36  /  AlkPhos  82  03-09      Urinalysis Basic - ( 09 Mar 2025 16:04 )    Color: x / Appearance: x / SG: x / pH: x  Gluc: 113 mg/dL / Ketone: x  / Bili: x / Urobili: x   Blood: x / Protein: x / Nitrite: x   Leuk Esterase: x / RBC: x / WBC x   Sq Epi: x / Non Sq Epi: x / Bacteria: x      CAPILLARY BLOOD GLUCOSE        RADIOLOGY & ADDITIONAL TESTS: Reviewed. Progress Note    INTERVAL EVENTS:   No acute events overnight. Enemas completed this morning without incident.     SUBJECTIVE:   Patient seen and examined at bedside. Reports abdominal pain is now dull and improved since last night. Denies any bloody BMs this AM. No acute complaints at this time.    ROS:  Negative unless otherwise stated above.    PHYSICAL EXAM:  General: Alert and oriented x 3. No acute distress.   HEENT: Mildly dry mucous membranes. Anicteric. No cervical lymphadenopathy.  Cardiovascular: Regular rate and rhythm. No murmur. Normal JVP.  Lungs: Clear to auscultation bilaterally. No accessory muscle use.  Abdomen: Soft and non-distended. Diffusely tender in all quadrants; increased tenderness in mid quadrants No palpable masses.  Extremities: No edema. Warm and well perfused.  Skin: No rashes or lesions. Pallor.     VITAL SIGNS:  Vital Signs Last 24 Hrs  T(C): 36.4 (10 Mar 2025 05:11), Max: 36.8 (09 Mar 2025 21:58)  T(F): 97.6 (10 Mar 2025 05:11), Max: 98.2 (09 Mar 2025 21:58)  HR: 57 (10 Mar 2025 05:11) (56 - 82)  BP: 123/73 (10 Mar 2025 05:11) (122/76 - 145/95)  BP(mean): 90 (10 Mar 2025 05:11) (90 - 112)  RR: 18 (10 Mar 2025 05:11) (16 - 18)  SpO2: 95% (10 Mar 2025 05:11) (95% - 98%)    Parameters below as of 10 Mar 2025 05:11  Patient On (Oxygen Delivery Method): room air      INPATIENT MEDICATIONS:   MEDICATIONS  (STANDING):  amLODIPine   Tablet 5 milliGRAM(s) Oral daily  pantoprazole    Tablet 40 milliGRAM(s) Oral before breakfast    MEDICATIONS  (PRN):  acetaminophen     Tablet .. 650 milliGRAM(s) Oral every 6 hours PRN Temp greater or equal to 38C (100.4F), Mild Pain (1 - 3)  aluminum hydroxide/magnesium hydroxide/simethicone Suspension 30 milliLiter(s) Oral every 4 hours PRN Dyspepsia  melatonin 3 milliGRAM(s) Oral at bedtime PRN Insomnia    HOME MEDICATIONS  amLODIPine 5 mg oral tablet: 1 tab(s) orally once a day  budesonide 3 mg oral delayed release capsule: 3 cap(s) orally 3 times a day  Entyvio Pen 108 mg/0.68 mL subcutaneous solution: 108 milligram(s) subcutaneously every 2 months  pantoprazole 40 mg oral delayed release tablet: 1 tab(s) orally once a day    ALLERGIES:  Allergies    hydrocodone (Unknown)  Biaxin (Unknown)    Intolerances    LABS:                       15.3   7.10  )-----------( 317      ( 09 Mar 2025 16:04 )             44.6     03-09    139  |  103  |  8   ----------------------------<  113[H]  4.1   |  26  |  0.98    Ca    9.2      09 Mar 2025 16:04    TPro  7.5  /  Alb  4.3  /  TBili  0.9  /  DBili  x   /  AST  22  /  ALT  36  /  AlkPhos  82  03-09      Urinalysis Basic - ( 09 Mar 2025 16:04 )    Color: x / Appearance: x / SG: x / pH: x  Gluc: 113 mg/dL / Ketone: x  / Bili: x / Urobili: x   Blood: x / Protein: x / Nitrite: x   Leuk Esterase: x / RBC: x / WBC x   Sq Epi: x / Non Sq Epi: x / Bacteria: x      CAPILLARY BLOOD GLUCOSE        RADIOLOGY & ADDITIONAL TESTS: Reviewed.

## 2025-03-10 NOTE — DISCHARGE NOTE NURSING/CASE MANAGEMENT/SOCIAL WORK - PATIENT PORTAL LINK FT
You can access the FollowMyHealth Patient Portal offered by [facility] by registering at the following website: [email or web address]. By joining [facility]’s FollowMyHealth portal, you will also be able to view your health information using other applications (apps) compatible with our system.

## 2025-03-10 NOTE — PROGRESS NOTE ADULT - ASSESSMENT
Pt is 50M w/PMHx of HTN, UC (2003), Diverticulosis, Rectal prolapse, and GERD who presents with weight loss, abdominal pain, cramping, and hematochezia sent in by gastroenterologist, Dr Johnson (most recent C-scope/EGD 10/2024). Admitted for Scope in AM  Pt is 50M w/ PMHx of HTN, UC (2003, on biologic VDZ), Diverticulosis, Rectal prolapse, and GERD who presents with weight loss, abdominal pain, cramping, and hematochezia for 2 days. Trialed Budesonide 9mg (3mg TID) outpatient and ultimately sent in by gastroenterologist, Dr Johnson after worsening of symptoms. Most recent C-scope/EGD 10/2024 showing remission of UC. In ED, pt hemodynamically stable. Elevated CRP. Lactate 1.4. Hgb 15.3. WBC 7.10. Neg GI PCR and neg C. diff. CTAP: Circumferential mural thickening in the descending and sigmoid colon consistent with colitis involving the descending and sigmoid colon. GI consulted and pt admitted for felx sig Scope in AM.     Given weight loss, hematochezia, and abdominal pain in the setting of recent life stressor, concerned patient has developed ulcerative colitis flare. No abdominal distention or signs of toxic megacolon on CTAP. Patient with known diverticulosis and rectal prolapse would could be alternative causes for patient's hematochezia, but not classically present with abdominal pain and unintentional weight loss. No recent worsening of GERD or increased NSAID use; thus low suspicion for upper GI bleed with rapid transit.  Pt is 50M w/ PMHx of HTN, UC (diagnosed in 2005, on biologic VDZ), Diverticulosis, Rectal prolapse, and GERD who presents with weight loss, abdominal pain, cramping, and hematochezia for 2 days. Trialed Budesonide 9mg (3mg TID) outpatient and ultimately sent in by gastroenterologist, Dr Johnson after worsening of symptoms. Most recent C-scope/EGD 10/2024 showing remission of UC. In ED, pt hemodynamically stable. Elevated CRP. Lactate 1.4. Hgb 15.3. WBC 7.10. Neg GI PCR and neg C. diff. CTAP: Circumferential mural thickening in the descending and sigmoid colon consistent with colitis involving the descending and sigmoid colon. GI consulted and pt admitted for flex sig Scope in AM.     Given weight loss, hematochezia, and abdominal pain in the setting of recent life stressor, concerned patient has developed ulcerative colitis flare. No abdominal distention or signs of toxic megacolon on CTAP. Patient with known diverticulosis and rectal prolapse would could be alternative causes for patient's hematochezia, but not classically present with abdominal pain and unintentional weight loss. No recent worsening of GERD or increased NSAID use; thus low suspicion for upper GI bleed with rapid transit. No recent infection or antibiotic use. No fevers, chills, or night sweats.  Pt is 50M w/ PMHx of HTN, UC (diagnosed in 2005, on biologic VDZ), Diverticulosis, Rectal prolapse, and GERD who presents with weight loss, abdominal pain, cramping, and hematochezia for 2 days; c/f UC flare, pending flex sig (3/10).
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
## 2025-03-10 NOTE — DISCHARGE NOTE PROVIDER - PROVIDER TOKENS
PROVIDER:[TOKEN:[[id number]],SCHEDULEDAPPT:[03/12/2025],SCHEDULEDAPPTTIME:[01:45 PM]],PROVIDER:[TOKEN:[[id number]],FOLLOWUP:[2 weeks]]

## 2025-03-10 NOTE — PROGRESS NOTE ADULT - PROBLEM SELECTOR PLAN 4
F: s/p 1L NS   E: replete as needed; Keep K>4, Mg >2   D: CLD --> NPO @ MN  DVT Proph: IMPROVE score 0    Dispo: RMF F: s/p 1L NS   E: replete as needed; Keep K>4, Mg >2   D: CLD --> NPO @ MN --> DASH Diet s/p scope  DVT Proph: IMPROVE score 0    Dispo: RMF F: s/p 1L NS   E: replete as needed; Keep K>4, Mg >2   D: CLD --> NPO @ MN --> DASH Diet s/p scope  DVT PPx: IMPROVE score 0    Dispo: RMF

## 2025-03-10 NOTE — CHART NOTE - NSCHARTNOTEFT_GEN_A_CORE
3/10/25 flex sig    Impressions:  	Granularity, erythema and edema in the descending colon and sigmoid colon from 40cm to 28cm insertion from anus. (Biopsy).  	Mild diverticulosis of the sigmoid colon.  	Rectal prolapse in the anus.    Plan  - Inflammation localized to descending and sigmoid, measured area from 40cm to 28cm insertion from anus. Inflammation most consistent with Travis I-II findings.    - Discharge to home    - Await pathology results.    - Advance diet as tolerated     - Patient to initiate prednisone 40mg orally q24 hours and taper every 7 days as symptoms tolerate.    - Patient to have close follow up with outpatient gastroenterology team later this week/early next week to evaluate symptoms and taper steroids as necessary.    César Cramer DO  Gastroenterology Fellow  GI Consult Pager Weekdays 7am-5pm: [phone]  Weeknights/Weekend/Holiday Coverage: Please Call the  for Contact Information  Follow Up Questions Welcome via [place] [employer] Teams Messenger

## 2025-03-10 NOTE — DISCHARGE NOTE PROVIDER - CARE PROVIDER_API CALL
Reji Hart  Internal Medicine  [address] [place] [address], [address]  [place] [address]  Phone: [phone]  Fax: [phone]  Scheduled Appointment: 03/12/2025 01:45 PM    César Cramer  Internal Medicine  [address] [address]  Phone: [phone]  Fax: [phone]  Follow Up Time: 2 weeks

## 2025-03-10 NOTE — DISCHARGE NOTE NURSING/CASE MANAGEMENT/SOCIAL WORK - FINANCIAL ASSISTANCE
[facility] provides services at a reduced cost to those who are determined to be eligible through [facility]’s financial assistance program. Information regarding [facility]’s financial assistance program can be found by going to [email or web address] or by calling [phone].

## 2025-03-10 NOTE — DISCHARGE NOTE PROVIDER - CARE PROVIDERS DIRECT ADDRESSES
[email or web address]

## 2025-03-11 RX ORDER — PREDNISONE 20 MG/1
4 TABLET ORAL
Qty: 80 | Refills: 0
Start: 2025-03-11 | End: 2025-03-30

## 2025-03-11 RX ORDER — PREDNISONE 20 MG/1
2 TABLET ORAL
Qty: 12 | Refills: 0
Start: 2025-03-11 | End: 2025-03-16

## 2025-03-12 PROBLEM — K51.90 ULCERATIVE COLITIS, UNSPECIFIED, WITHOUT COMPLICATIONS: Chronic | Status: ACTIVE | Noted: 2025-03-09

## 2025-03-12 PROBLEM — K21.9 GASTRO-ESOPHAGEAL REFLUX DISEASE WITHOUT ESOPHAGITIS: Chronic | Status: ACTIVE | Noted: 2025-03-09

## 2025-03-12 PROBLEM — I10 ESSENTIAL (PRIMARY) HYPERTENSION: Chronic | Status: ACTIVE | Noted: 2025-03-09

## 2025-03-12 LAB — CALPROTECTIN STL-MCNT: 99 UG/G — SIGNIFICANT CHANGE UP (ref 0–120)

## 2025-03-14 DIAGNOSIS — Z79.899 OTHER LONG TERM (CURRENT) DRUG THERAPY: ICD-10-CM

## 2025-03-14 DIAGNOSIS — I10 ESSENTIAL (PRIMARY) HYPERTENSION: ICD-10-CM

## 2025-03-14 DIAGNOSIS — K62.3 RECTAL PROLAPSE: ICD-10-CM

## 2025-03-14 DIAGNOSIS — K51.911 ULCERATIVE COLITIS, UNSPECIFIED WITH RECTAL BLEEDING: ICD-10-CM

## 2025-03-14 DIAGNOSIS — K21.9 GASTRO-ESOPHAGEAL REFLUX DISEASE WITHOUT ESOPHAGITIS: ICD-10-CM

## 2025-03-14 DIAGNOSIS — Z88.1 ALLERGY STATUS TO OTHER ANTIBIOTIC AGENTS: ICD-10-CM

## 2025-03-14 DIAGNOSIS — K57.30 DIVERTICULOSIS OF LARGE INTESTINE WITHOUT PERFORATION OR ABSCESS WITHOUT BLEEDING: ICD-10-CM

## 2025-03-14 DIAGNOSIS — Z88.5 ALLERGY STATUS TO NARCOTIC AGENT: ICD-10-CM

## 2025-03-14 DIAGNOSIS — Z79.620 LONG TERM (CURRENT) USE OF IMMUNOSUPPRESSIVE BIOLOGIC: ICD-10-CM

## 2025-03-19 ENCOUNTER — APPOINTMENT (OUTPATIENT)
Dept: GASTROENTEROLOGY | Facility: CLINIC | Age: 50
End: 2025-03-19

## 2025-03-19 ENCOUNTER — NON-APPOINTMENT (OUTPATIENT)
Age: 50
End: 2025-03-19

## 2025-03-19 VITALS
HEIGHT: 66 IN | SYSTOLIC BLOOD PRESSURE: 122 MMHG | DIASTOLIC BLOOD PRESSURE: 78 MMHG | BODY MASS INDEX: 27.16 KG/M2 | OXYGEN SATURATION: 98 % | TEMPERATURE: 97.9 F | HEART RATE: 75 BPM | RESPIRATION RATE: 16 BRPM | WEIGHT: 169 LBS

## 2025-03-19 DIAGNOSIS — Z79.52 LONG TERM (CURRENT) USE OF SYSTEMIC STEROIDS: ICD-10-CM

## 2025-03-19 PROCEDURE — 99214 OFFICE O/P EST MOD 30 MIN: CPT

## 2025-03-19 PROCEDURE — 99495 TRANSJ CARE MGMT MOD F2F 14D: CPT

## 2025-03-19 RX ORDER — PREDNISONE 20 MG/1
20 TABLET ORAL
Refills: 0 | Status: ACTIVE | COMMUNITY

## 2025-03-19 RX ORDER — UBIDECARENONE 100 MG
600-800 CAPSULE ORAL
Qty: 30 | Refills: 0 | Status: ACTIVE | COMMUNITY
Start: 2025-03-19 | End: 1900-01-01

## 2025-03-19 RX ORDER — PREDNISONE 10 MG/1
10 TABLET ORAL
Refills: 0 | Status: ACTIVE | COMMUNITY

## 2025-03-27 ENCOUNTER — APPOINTMENT (OUTPATIENT)
Dept: CARDIOLOGY | Facility: CLINIC | Age: 50
End: 2025-03-27
Payer: COMMERCIAL

## 2025-03-27 ENCOUNTER — LABORATORY RESULT (OUTPATIENT)
Age: 50
End: 2025-03-27

## 2025-03-27 ENCOUNTER — NON-APPOINTMENT (OUTPATIENT)
Age: 50
End: 2025-03-27

## 2025-03-27 VITALS
RESPIRATION RATE: 16 BRPM | OXYGEN SATURATION: 99 % | BODY MASS INDEX: 26.52 KG/M2 | TEMPERATURE: 98 F | DIASTOLIC BLOOD PRESSURE: 79 MMHG | HEART RATE: 71 BPM | WEIGHT: 165 LBS | SYSTOLIC BLOOD PRESSURE: 125 MMHG | HEIGHT: 66 IN

## 2025-03-27 DIAGNOSIS — R01.1 CARDIAC MURMUR, UNSPECIFIED: ICD-10-CM

## 2025-03-27 DIAGNOSIS — R73.03 PREDIABETES.: ICD-10-CM

## 2025-03-27 DIAGNOSIS — R06.09 OTHER FORMS OF DYSPNEA: ICD-10-CM

## 2025-03-27 DIAGNOSIS — I10 ESSENTIAL (PRIMARY) HYPERTENSION: ICD-10-CM

## 2025-03-27 PROCEDURE — 93306 TTE W/DOPPLER COMPLETE: CPT

## 2025-03-27 PROCEDURE — G2211 COMPLEX E/M VISIT ADD ON: CPT | Mod: NC

## 2025-03-27 PROCEDURE — 93000 ELECTROCARDIOGRAM COMPLETE: CPT

## 2025-03-27 PROCEDURE — 99214 OFFICE O/P EST MOD 30 MIN: CPT

## 2025-04-02 ENCOUNTER — APPOINTMENT (OUTPATIENT)
Dept: RHEUMATOLOGY | Facility: CLINIC | Age: 50
End: 2025-04-02
Payer: COMMERCIAL

## 2025-04-02 VITALS
OXYGEN SATURATION: 98 % | DIASTOLIC BLOOD PRESSURE: 85 MMHG | HEART RATE: 78 BPM | RESPIRATION RATE: 15 BRPM | SYSTOLIC BLOOD PRESSURE: 130 MMHG

## 2025-04-02 DIAGNOSIS — K51.90 ULCERATIVE COLITIS, UNSPECIFIED, W/OUT COMPLICATIONS: ICD-10-CM

## 2025-04-02 PROCEDURE — 96413 CHEMO IV INFUSION 1 HR: CPT

## 2025-04-02 PROCEDURE — 36415 COLL VENOUS BLD VENIPUNCTURE: CPT

## 2025-04-02 RX ORDER — VEDOLIZUMAB 300 MG/5ML
300 INJECTION, POWDER, LYOPHILIZED, FOR SOLUTION INTRAVENOUS
Qty: 0 | Refills: 0 | Status: COMPLETED | OUTPATIENT
Start: 2025-03-26

## 2025-04-04 LAB
ALBUMIN SERPL ELPH-MCNC: 4.7 G/DL
ALP BLD-CCNC: 89 U/L
ALT SERPL-CCNC: 40 U/L
ANION GAP SERPL CALC-SCNC: 14 MMOL/L
AST SERPL-CCNC: 23 U/L
BASOPHILS # BLD AUTO: 0.01 K/UL
BASOPHILS NFR BLD AUTO: 0.1 %
BILIRUB SERPL-MCNC: 0.7 MG/DL
BUN SERPL-MCNC: 9 MG/DL
CALCIUM SERPL-MCNC: 9.7 MG/DL
CHLORIDE SERPL-SCNC: 103 MMOL/L
CO2 SERPL-SCNC: 25 MMOL/L
CREAT SERPL-MCNC: 0.97 MG/DL
CRP SERPL-MCNC: 7 MG/L
EGFRCR SERPLBLD CKD-EPI 2021: 94 ML/MIN/1.73M2
EOSINOPHIL # BLD AUTO: 0.11 K/UL
EOSINOPHIL NFR BLD AUTO: 1.6 %
GLUCOSE SERPL-MCNC: 111 MG/DL
HCT VFR BLD CALC: 46.6 %
HGB BLD-MCNC: 15.3 G/DL
IMM GRANULOCYTES NFR BLD AUTO: 0.3 %
LYMPHOCYTES # BLD AUTO: 1.63 K/UL
LYMPHOCYTES NFR BLD AUTO: 24.4 %
MAN DIFF?: NORMAL
MCHC RBC-ENTMCNC: 30.6 PG
MCHC RBC-ENTMCNC: 32.8 G/DL
MCV RBC AUTO: 93.2 FL
MONOCYTES # BLD AUTO: 0.55 K/UL
MONOCYTES NFR BLD AUTO: 8.2 %
NEUTROPHILS # BLD AUTO: 4.37 K/UL
NEUTROPHILS NFR BLD AUTO: 65.4 %
PLATELET # BLD AUTO: 315 K/UL
POTASSIUM SERPL-SCNC: 4.8 MMOL/L
PROT SERPL-MCNC: 7.3 G/DL
RBC # BLD: 5 M/UL
RBC # FLD: 13.1 %
SODIUM SERPL-SCNC: 142 MMOL/L
WBC # FLD AUTO: 6.69 K/UL

## 2025-04-22 ENCOUNTER — APPOINTMENT (OUTPATIENT)
Dept: GASTROENTEROLOGY | Facility: CLINIC | Age: 50
End: 2025-04-22
Payer: COMMERCIAL

## 2025-04-22 DIAGNOSIS — K51.90 ULCERATIVE COLITIS, UNSPECIFIED, W/OUT COMPLICATIONS: ICD-10-CM

## 2025-04-22 DIAGNOSIS — D84.9 IMMUNODEFICIENCY, UNSPECIFIED: ICD-10-CM

## 2025-04-22 DIAGNOSIS — K62.3 RECTAL PROLAPSE: ICD-10-CM

## 2025-04-22 PROCEDURE — 99214 OFFICE O/P EST MOD 30 MIN: CPT | Mod: 95

## 2025-05-30 ENCOUNTER — APPOINTMENT (OUTPATIENT)
Dept: RHEUMATOLOGY | Facility: CLINIC | Age: 50
End: 2025-05-30
Payer: COMMERCIAL

## 2025-05-30 VITALS
DIASTOLIC BLOOD PRESSURE: 89 MMHG | SYSTOLIC BLOOD PRESSURE: 133 MMHG | HEART RATE: 66 BPM | RESPIRATION RATE: 15 BRPM | OXYGEN SATURATION: 97 % | TEMPERATURE: 97.9 F

## 2025-05-30 DIAGNOSIS — K51.90 ULCERATIVE COLITIS, UNSPECIFIED, W/OUT COMPLICATIONS: ICD-10-CM

## 2025-05-30 PROCEDURE — 96413 CHEMO IV INFUSION 1 HR: CPT

## 2025-05-30 PROCEDURE — 36415 COLL VENOUS BLD VENIPUNCTURE: CPT

## 2025-05-30 RX ORDER — VEDOLIZUMAB 300 MG/5ML
300 INJECTION, POWDER, LYOPHILIZED, FOR SOLUTION INTRAVENOUS
Qty: 0 | Refills: 0 | Status: COMPLETED | OUTPATIENT
Start: 2025-05-22

## 2025-05-31 LAB
ALBUMIN SERPL ELPH-MCNC: 4.7 G/DL
ALP BLD-CCNC: 82 U/L
ALT SERPL-CCNC: 43 U/L
ANION GAP SERPL CALC-SCNC: 13 MMOL/L
AST SERPL-CCNC: 30 U/L
BASOPHILS # BLD AUTO: 0.01 K/UL
BASOPHILS NFR BLD AUTO: 0.2 %
BILIRUB SERPL-MCNC: 1 MG/DL
BUN SERPL-MCNC: 10 MG/DL
CALCIUM SERPL-MCNC: 9.6 MG/DL
CHLORIDE SERPL-SCNC: 102 MMOL/L
CO2 SERPL-SCNC: 26 MMOL/L
CREAT SERPL-MCNC: 0.98 MG/DL
CRP SERPL-MCNC: 5 MG/L
EGFRCR SERPLBLD CKD-EPI 2021: 93 ML/MIN/1.73M2
EOSINOPHIL # BLD AUTO: 0.15 K/UL
EOSINOPHIL NFR BLD AUTO: 2.6 %
GLUCOSE SERPL-MCNC: 92 MG/DL
HCT VFR BLD CALC: 45.9 %
HGB BLD-MCNC: 15.1 G/DL
IMM GRANULOCYTES NFR BLD AUTO: 0.2 %
LYMPHOCYTES # BLD AUTO: 1.75 K/UL
LYMPHOCYTES NFR BLD AUTO: 29.8 %
MAN DIFF?: NORMAL
MCHC RBC-ENTMCNC: 29.7 PG
MCHC RBC-ENTMCNC: 32.9 G/DL
MCV RBC AUTO: 90.4 FL
MONOCYTES # BLD AUTO: 0.53 K/UL
MONOCYTES NFR BLD AUTO: 9 %
NEUTROPHILS # BLD AUTO: 3.43 K/UL
NEUTROPHILS NFR BLD AUTO: 58.2 %
PLATELET # BLD AUTO: 343 K/UL
POTASSIUM SERPL-SCNC: 4.5 MMOL/L
PROT SERPL-MCNC: 7.2 G/DL
RBC # BLD: 5.08 M/UL
RBC # FLD: 13.1 %
SODIUM SERPL-SCNC: 140 MMOL/L
WBC # FLD AUTO: 5.88 K/UL

## 2025-07-23 ENCOUNTER — APPOINTMENT (OUTPATIENT)
Dept: CARDIOLOGY | Facility: CLINIC | Age: 50
End: 2025-07-23

## 2025-07-25 ENCOUNTER — APPOINTMENT (OUTPATIENT)
Dept: RHEUMATOLOGY | Facility: CLINIC | Age: 50
End: 2025-07-25
Payer: COMMERCIAL

## 2025-07-25 VITALS
HEART RATE: 60 BPM | OXYGEN SATURATION: 98 % | SYSTOLIC BLOOD PRESSURE: 121 MMHG | TEMPERATURE: 97.8 F | DIASTOLIC BLOOD PRESSURE: 80 MMHG | RESPIRATION RATE: 14 BRPM

## 2025-07-25 VITALS
TEMPERATURE: 98.2 F | RESPIRATION RATE: 14 BRPM | SYSTOLIC BLOOD PRESSURE: 127 MMHG | OXYGEN SATURATION: 97 % | DIASTOLIC BLOOD PRESSURE: 87 MMHG | HEART RATE: 62 BPM

## 2025-07-25 PROCEDURE — 96413 CHEMO IV INFUSION 1 HR: CPT

## 2025-07-25 PROCEDURE — 36415 COLL VENOUS BLD VENIPUNCTURE: CPT

## 2025-07-25 RX ORDER — VEDOLIZUMAB 300 MG/5ML
300 INJECTION, POWDER, LYOPHILIZED, FOR SOLUTION INTRAVENOUS
Qty: 0 | Refills: 0 | Status: COMPLETED | OUTPATIENT
Start: 2025-07-16

## 2025-07-26 LAB
ALBUMIN SERPL ELPH-MCNC: 4.9 G/DL
ALP BLD-CCNC: 96 U/L
ALT SERPL-CCNC: 49 U/L
ANION GAP SERPL CALC-SCNC: 14 MMOL/L
AST SERPL-CCNC: 33 U/L
BASOPHILS # BLD AUTO: 0.01 K/UL
BASOPHILS NFR BLD AUTO: 0.2 %
BILIRUB SERPL-MCNC: 0.9 MG/DL
BUN SERPL-MCNC: 15 MG/DL
CALCIUM SERPL-MCNC: 9.8 MG/DL
CHLORIDE SERPL-SCNC: 99 MMOL/L
CO2 SERPL-SCNC: 25 MMOL/L
CREAT SERPL-MCNC: 1.01 MG/DL
CRP SERPL-MCNC: 12 MG/L
EGFRCR SERPLBLD CKD-EPI 2021: 89 ML/MIN/1.73M2
EOSINOPHIL # BLD AUTO: 0.18 K/UL
EOSINOPHIL NFR BLD AUTO: 2.7 %
GLUCOSE SERPL-MCNC: 78 MG/DL
HCT VFR BLD CALC: 45.5 %
HGB BLD-MCNC: 15 G/DL
IMM GRANULOCYTES NFR BLD AUTO: 0.3 %
LYMPHOCYTES # BLD AUTO: 1.84 K/UL
LYMPHOCYTES NFR BLD AUTO: 27.6 %
MAN DIFF?: NORMAL
MCHC RBC-ENTMCNC: 30.1 PG
MCHC RBC-ENTMCNC: 33 G/DL
MCV RBC AUTO: 91.4 FL
MONOCYTES # BLD AUTO: 0.65 K/UL
MONOCYTES NFR BLD AUTO: 9.8 %
NEUTROPHILS # BLD AUTO: 3.96 K/UL
NEUTROPHILS NFR BLD AUTO: 59.4 %
PLATELET # BLD AUTO: 311 K/UL
POTASSIUM SERPL-SCNC: 4.1 MMOL/L
PROT SERPL-MCNC: 7.6 G/DL
RBC # BLD: 4.98 M/UL
RBC # FLD: 13.1 %
SODIUM SERPL-SCNC: 138 MMOL/L
WBC # FLD AUTO: 6.66 K/UL

## 2025-08-08 ENCOUNTER — APPOINTMENT (OUTPATIENT)
Dept: GASTROENTEROLOGY | Facility: CLINIC | Age: 50
End: 2025-08-08
Payer: COMMERCIAL

## 2025-08-08 DIAGNOSIS — K21.9 GASTRO-ESOPHAGEAL REFLUX DISEASE W/OUT ESOPHAGITIS: ICD-10-CM

## 2025-08-08 DIAGNOSIS — D84.9 IMMUNODEFICIENCY, UNSPECIFIED: ICD-10-CM

## 2025-08-08 DIAGNOSIS — K62.3 RECTAL PROLAPSE: ICD-10-CM

## 2025-08-08 DIAGNOSIS — K51.90 ULCERATIVE COLITIS, UNSPECIFIED, W/OUT COMPLICATIONS: ICD-10-CM

## 2025-08-08 PROCEDURE — 99214 OFFICE O/P EST MOD 30 MIN: CPT | Mod: 95

## 2025-08-08 RX ORDER — PANTOPRAZOLE 20 MG/1
20 TABLET, DELAYED RELEASE ORAL
Qty: 90 | Refills: 3 | Status: ACTIVE | COMMUNITY
Start: 2025-08-08 | End: 1900-01-01

## 2025-09-11 ENCOUNTER — RX RENEWAL (OUTPATIENT)
Age: 50
End: 2025-09-11

## 2025-09-19 ENCOUNTER — APPOINTMENT (OUTPATIENT)
Dept: RHEUMATOLOGY | Facility: CLINIC | Age: 50
End: 2025-09-19
Payer: COMMERCIAL

## 2025-09-19 VITALS
RESPIRATION RATE: 14 BRPM | HEART RATE: 65 BPM | OXYGEN SATURATION: 96 % | DIASTOLIC BLOOD PRESSURE: 84 MMHG | TEMPERATURE: 98 F | SYSTOLIC BLOOD PRESSURE: 121 MMHG

## 2025-09-19 VITALS
OXYGEN SATURATION: 97 % | HEART RATE: 76 BPM | RESPIRATION RATE: 14 BRPM | SYSTOLIC BLOOD PRESSURE: 123 MMHG | TEMPERATURE: 98 F | DIASTOLIC BLOOD PRESSURE: 82 MMHG

## 2025-09-19 DIAGNOSIS — K51.90 ULCERATIVE COLITIS, UNSPECIFIED, W/OUT COMPLICATIONS: ICD-10-CM

## 2025-09-19 PROCEDURE — 96413 CHEMO IV INFUSION 1 HR: CPT

## 2025-09-19 PROCEDURE — 36415 COLL VENOUS BLD VENIPUNCTURE: CPT

## 2025-09-20 LAB
ALBUMIN SERPL ELPH-MCNC: 4.7 G/DL
ALP BLD-CCNC: 89 U/L
ALT SERPL-CCNC: 46 U/L
ANION GAP SERPL CALC-SCNC: 12 MMOL/L
AST SERPL-CCNC: 31 U/L
BASOPHILS # BLD AUTO: 0.02 K/UL
BASOPHILS NFR BLD AUTO: 0.3 %
BILIRUB SERPL-MCNC: 0.9 MG/DL
BUN SERPL-MCNC: 10 MG/DL
CALCIUM SERPL-MCNC: 9.9 MG/DL
CHLORIDE SERPL-SCNC: 101 MMOL/L
CO2 SERPL-SCNC: 27 MMOL/L
CREAT SERPL-MCNC: 1 MG/DL
CRP SERPL-MCNC: 8 MG/L
EGFRCR SERPLBLD CKD-EPI 2021: 91 ML/MIN/1.73M2
EOSINOPHIL # BLD AUTO: 0.17 K/UL
EOSINOPHIL NFR BLD AUTO: 2.6 %
GLUCOSE SERPL-MCNC: 92 MG/DL
HCT VFR BLD CALC: 48.1 %
HGB BLD-MCNC: 15.4 G/DL
IMM GRANULOCYTES NFR BLD AUTO: 0.2 %
LYMPHOCYTES # BLD AUTO: 1.58 K/UL
LYMPHOCYTES NFR BLD AUTO: 23.8 %
MAN DIFF?: NORMAL
MCHC RBC-ENTMCNC: 29.6 PG
MCHC RBC-ENTMCNC: 32 G/DL
MCV RBC AUTO: 92.3 FL
MONOCYTES # BLD AUTO: 0.59 K/UL
MONOCYTES NFR BLD AUTO: 8.9 %
NEUTROPHILS # BLD AUTO: 4.26 K/UL
NEUTROPHILS NFR BLD AUTO: 64.2 %
PLATELET # BLD AUTO: 360 K/UL
POTASSIUM SERPL-SCNC: 4.5 MMOL/L
PROT SERPL-MCNC: 7.5 G/DL
RBC # BLD: 5.21 M/UL
RBC # FLD: 13 %
SODIUM SERPL-SCNC: 139 MMOL/L
WBC # FLD AUTO: 6.63 K/UL

## 2025-09-26 PROBLEM — K76.0 HEPATIC STEATOSIS: Status: ACTIVE | Noted: 2025-09-26

## (undated) DEVICE — FORCEP RADIAL JAW 4 W NDL 2.2MM 2.8MM 240CM ORANGE DISP